# Patient Record
Sex: MALE | Race: WHITE | NOT HISPANIC OR LATINO | URBAN - METROPOLITAN AREA
[De-identification: names, ages, dates, MRNs, and addresses within clinical notes are randomized per-mention and may not be internally consistent; named-entity substitution may affect disease eponyms.]

---

## 2021-01-22 ENCOUNTER — OFFICE (AMBULATORY)
Dept: URBAN - METROPOLITAN AREA CLINIC 75 | Facility: CLINIC | Age: 21
End: 2021-01-22

## 2021-01-22 VITALS
WEIGHT: 158 LBS | HEART RATE: 78 BPM | OXYGEN SATURATION: 99 % | RESPIRATION RATE: 16 BRPM | HEIGHT: 70 IN | TEMPERATURE: 97.5 F

## 2021-01-22 DIAGNOSIS — D18.02 HEMANGIOMA OF INTRACRANIAL STRUCTURES: ICD-10-CM

## 2021-01-22 DIAGNOSIS — K21.9 GASTRO-ESOPHAGEAL REFLUX DISEASE WITHOUT ESOPHAGITIS: ICD-10-CM

## 2021-01-22 DIAGNOSIS — R11.0 NAUSEA: ICD-10-CM

## 2021-01-22 PROCEDURE — 99204 OFFICE O/P NEW MOD 45 MIN: CPT | Performed by: INTERNAL MEDICINE

## 2021-01-22 RX ORDER — OMEPRAZOLE 40 MG/1
40 CAPSULE, DELAYED RELEASE ORAL
Qty: 90 | Refills: 1 | Status: ACTIVE
Start: 2021-01-22

## 2021-01-22 NOTE — SERVICENOTES
Impression:
20M with CCMs associated with chronic nausea and GERD as well as abdominal cramping. I recommended he increase his omeprazole to 40 MG taken once daily the morning. We'll schedule him for an EGD. If symptoms persist. Further testing for celiac disease as well as breath tests. We could also do a trial of FDgard. Follow-up in 3 months.

## 2021-01-22 NOTE — SERVICEHPINOTES
1/22/2021   I did have the pleasure of seeing   CINDY SALCEDO  20  2000   at the request of   GLORIA HARTLEY  for a new patient consultation in our [facility] office. I sincerely appreciate the opportunity to participate in the care of this patient.   Mr. Salcedo is a 20M with a significant past medical history of several cerebral cavernous malformations presenting for evaluation of chronic nausea. He states that he has had nausea for most of his life. As a result of his CCMs he follow-up with her neurologist to monitor some annual MRIs. He states at one point he developed one in his spine that required surgery. The nausea has been going on for his whole life" seems to be worse over the past 3-6 months. Almost every time he eats he'll gets midepigastric cramping. He also have nausea and some vomiting. Previously was taking a medication for ADHD. He has pediatrician thought that was the cause of his nausea. It was discontinued the symptoms persisted. Several months ago he started taking Prilosec 20 MG over-the-counter with no help. He does believe that red dye and foods that contain red dye make his symptoms worse. He has a lot of abdominal cramping and achiness.He denies a change in bowel habits. No diarrhea, constipation. He has no blood in his stool. No dysphagia.No prior EGD. No prior colonoscopy.

## 2021-02-15 VITALS
HEART RATE: 95 BPM | SYSTOLIC BLOOD PRESSURE: 128 MMHG | OXYGEN SATURATION: 93 % | DIASTOLIC BLOOD PRESSURE: 79 MMHG | RESPIRATION RATE: 16 BRPM | RESPIRATION RATE: 15 BRPM | OXYGEN SATURATION: 95 % | RESPIRATION RATE: 12 BRPM | SYSTOLIC BLOOD PRESSURE: 140 MMHG | HEIGHT: 70 IN | TEMPERATURE: 98.4 F | OXYGEN SATURATION: 100 % | HEART RATE: 82 BPM | DIASTOLIC BLOOD PRESSURE: 78 MMHG | OXYGEN SATURATION: 99 % | DIASTOLIC BLOOD PRESSURE: 89 MMHG | DIASTOLIC BLOOD PRESSURE: 67 MMHG | SYSTOLIC BLOOD PRESSURE: 126 MMHG | DIASTOLIC BLOOD PRESSURE: 75 MMHG | OXYGEN SATURATION: 94 % | DIASTOLIC BLOOD PRESSURE: 77 MMHG | RESPIRATION RATE: 20 BRPM | TEMPERATURE: 97.7 F | HEART RATE: 85 BPM | HEART RATE: 83 BPM | SYSTOLIC BLOOD PRESSURE: 125 MMHG | DIASTOLIC BLOOD PRESSURE: 68 MMHG | SYSTOLIC BLOOD PRESSURE: 122 MMHG | HEART RATE: 79 BPM | WEIGHT: 157 LBS | HEART RATE: 91 BPM | RESPIRATION RATE: 22 BRPM

## 2021-02-18 ENCOUNTER — AMBULATORY SURGICAL CENTER (AMBULATORY)
Dept: URBAN - METROPOLITAN AREA SURGERY 17 | Facility: SURGERY | Age: 21
End: 2021-02-18
Payer: OTHER GOVERNMENT

## 2021-02-18 ENCOUNTER — OFFICE (AMBULATORY)
Dept: URBAN - METROPOLITAN AREA PATHOLOGY 4 | Facility: PATHOLOGY | Age: 21
End: 2021-02-18
Payer: OTHER GOVERNMENT

## 2021-02-18 DIAGNOSIS — K29.70 GASTRITIS, UNSPECIFIED, WITHOUT BLEEDING: ICD-10-CM

## 2021-02-18 DIAGNOSIS — K21.9 GASTRO-ESOPHAGEAL REFLUX DISEASE WITHOUT ESOPHAGITIS: ICD-10-CM

## 2021-02-18 DIAGNOSIS — K31.89 OTHER DISEASES OF STOMACH AND DUODENUM: ICD-10-CM

## 2021-02-18 DIAGNOSIS — R11.0 NAUSEA: ICD-10-CM

## 2021-02-18 LAB
GI HISTOLOGY: A. UNSPECIFIED: (no result)
GI HISTOLOGY: B. UNSPECIFIED: (no result)
GI HISTOLOGY: C. UNSPECIFIED: (no result)
GI HISTOLOGY: PDF REPORT: (no result)

## 2021-02-18 PROCEDURE — 43239 EGD BIOPSY SINGLE/MULTIPLE: CPT | Performed by: INTERNAL MEDICINE

## 2021-02-18 PROCEDURE — 88305 TISSUE EXAM BY PATHOLOGIST: CPT | Performed by: INTERNAL MEDICINE

## 2023-05-09 ENCOUNTER — OFFICE VISIT (OUTPATIENT)
Dept: FAMILY MEDICINE CLINIC | Facility: CLINIC | Age: 23
End: 2023-05-09
Payer: OTHER GOVERNMENT

## 2023-05-09 VITALS
SYSTOLIC BLOOD PRESSURE: 132 MMHG | DIASTOLIC BLOOD PRESSURE: 84 MMHG | BODY MASS INDEX: 25.48 KG/M2 | OXYGEN SATURATION: 98 % | WEIGHT: 172 LBS | HEART RATE: 89 BPM | TEMPERATURE: 97.1 F | RESPIRATION RATE: 16 BRPM | HEIGHT: 69 IN

## 2023-05-09 DIAGNOSIS — M79.675 PAIN IN TOE OF LEFT FOOT: ICD-10-CM

## 2023-05-09 DIAGNOSIS — K21.9 GASTROESOPHAGEAL REFLUX DISEASE, UNSPECIFIED WHETHER ESOPHAGITIS PRESENT: Primary | ICD-10-CM

## 2023-05-09 PROBLEM — G43.009 MIGRAINE WITHOUT AURA AND WITHOUT STATUS MIGRAINOSUS, NOT INTRACTABLE: Status: ACTIVE | Noted: 2018-10-29

## 2023-05-09 PROBLEM — Q28.3 CAVERNOUS MALFORMATION: Status: ACTIVE | Noted: 2017-02-27

## 2023-05-09 PROCEDURE — 99204 OFFICE O/P NEW MOD 45 MIN: CPT

## 2023-05-09 RX ORDER — RIZATRIPTAN BENZOATE 10 MG/1
10 TABLET ORAL
COMMUNITY
Start: 2017-01-27

## 2023-05-09 RX ORDER — ONDANSETRON 4 MG/1
TABLET, ORALLY DISINTEGRATING ORAL
COMMUNITY
Start: 2023-04-17

## 2023-05-09 RX ORDER — ACETAMINOPHEN 325 MG/1
650 TABLET ORAL
COMMUNITY
Start: 2020-01-30

## 2023-05-09 RX ORDER — ALBUTEROL SULFATE 90 UG/1
2 AEROSOL, METERED RESPIRATORY (INHALATION)
COMMUNITY
Start: 2019-11-06

## 2023-05-09 RX ORDER — OMEPRAZOLE 40 MG/1
40 CAPSULE, DELAYED RELEASE ORAL DAILY
Qty: 90 CAPSULE | Refills: 1 | Status: SHIPPED | OUTPATIENT
Start: 2023-05-09

## 2023-05-09 RX ORDER — OMEPRAZOLE 20 MG/1
20 CAPSULE, DELAYED RELEASE ORAL DAILY
COMMUNITY
End: 2023-05-09 | Stop reason: DRUGHIGH

## 2023-05-09 RX ORDER — DULOXETIN HYDROCHLORIDE 20 MG/1
1 CAPSULE, DELAYED RELEASE ORAL DAILY
COMMUNITY
Start: 2023-03-21

## 2023-05-09 RX ORDER — TRAZODONE HYDROCHLORIDE 50 MG/1
TABLET ORAL
COMMUNITY
Start: 2023-03-19

## 2023-05-09 RX ORDER — AMITRIPTYLINE HYDROCHLORIDE 10 MG/1
10 TABLET, FILM COATED ORAL DAILY
COMMUNITY
Start: 2019-12-09

## 2023-05-09 NOTE — PROGRESS NOTES
Chief Complaint  Establish Care (Acid reflux-takes OTC omeprazole ) and Toe Pain    Subjective         History of Present Illness    Manuel Wood 22 y.o. male presents today for a new patient appointment. He is here to establish care and is a new patient to me.  I reviewed the PFSH recorded today by my MA/LPN staff.       The patient reports acid reflux.  Symptom onset was years ago and worsened in the last year.  He does eat some spicy foods and drinks alcohol twice per month.  He does lay down less than 2-3 hours after his last meal.  Treatment to date: OTC Omeprazole with no improvement.  After a discussion, he would like to continue Omeprazole but at the 40 mg dose.  He tolerates the medication well with no side effects.  Medication reviewed.  Will increase Omeprazole to 40 mg daily with GERD dietary and lifestyle restrictions.  He denies all associated symptoms.    He has cavernous malformation.  He had a laminectomy on T-9 in 2020.  He is managed by Dr. Garcia, Neurology.      Today, he reports he hit his left fifth toe one week ago.  He did have some bruising initially, but that has resolved.  He denies pain and swelling.  He reports some decreased sensation, which is not a new finding, due to cavernous malformation.        Review of Systems   Constitutional: Negative for appetite change, chills, fatigue and fever.   HENT: Negative for congestion, sinus pressure and trouble swallowing.    Eyes: Negative for visual disturbance.   Respiratory: Negative for cough, chest tightness, shortness of breath and wheezing.    Cardiovascular: Negative for chest pain, palpitations and leg swelling.   Gastrointestinal: Negative for abdominal distention, abdominal pain, blood in stool, constipation, diarrhea, nausea, rectal pain and vomiting.        Reflux   Genitourinary: Negative for dysuria, frequency and urgency.   Musculoskeletal: Positive for arthralgias (left fifth toe). Negative for gait problem, joint swelling, myalgias  "and neck pain.   Skin: Negative for rash.   Neurological: Negative for dizziness, syncope, weakness and light-headedness.   Psychiatric/Behavioral: Negative for dysphoric mood. The patient is not nervous/anxious.         Objective   Vital Signs:   /84   Pulse 89   Temp 97.1 °F (36.2 °C)   Resp 16   Ht 175.3 cm (69\")   Wt 78 kg (172 lb)   SpO2 98%   BMI 25.40 kg/m²      BMI is >= 25 and <30. (Overweight) The following options were offered after discussion;: exercise counseling/recommendations and nutrition counseling/recommendations    Physical Exam  Vitals and nursing note reviewed.   Constitutional:       General: He is not in acute distress.     Appearance: He is well-developed and overweight.   HENT:      Head: Normocephalic and atraumatic.   Eyes:      General: No scleral icterus.        Right eye: No discharge.         Left eye: No discharge.      Conjunctiva/sclera: Conjunctivae normal.      Pupils: Pupils are equal, round, and reactive to light.   Neck:      Thyroid: No thyromegaly.      Vascular: No carotid bruit.      Trachea: No tracheal deviation.   Cardiovascular:      Rate and Rhythm: Normal rate and regular rhythm.      Pulses: Normal pulses.      Heart sounds: Normal heart sounds. No murmur heard.    No gallop.   Pulmonary:      Effort: Pulmonary effort is normal. No respiratory distress.      Breath sounds: Normal breath sounds. No wheezing or rales.   Musculoskeletal:         General: No swelling.      Cervical back: Normal range of motion and neck supple.      Right lower leg: No edema.      Left lower leg: No edema.      Left foot: Decreased range of motion. Normal capillary refill. Tenderness present. No swelling, foot drop or bony tenderness. Normal pulse.      Comments: Pain with palpation of left fifth toe.  Decreased range of motion due to pain.  Mild erythema.  No swelling.  Sensation is intact.  No decreased pulses.   Feet:      Left foot:      Skin integrity: Erythema (Left " fifth toe) present. No ulcer, blister, skin breakdown or warmth.   Skin:     General: Skin is warm.      Coloration: Skin is not pale.      Findings: No erythema or rash.   Neurological:      Mental Status: He is alert and oriented to person, place, and time.      Sensory: Sensation is intact. No sensory deficit.      Motor: Motor function is intact. No weakness or abnormal muscle tone.      Coordination: Coordination normal.      Gait: Gait is intact. Gait normal.   Psychiatric:         Behavior: Behavior normal.         Thought Content: Thought content normal.         Judgment: Judgment normal.                         Assessment and Plan      Diagnoses and all orders for this visit:    1. Gastroesophageal reflux disease, unspecified whether esophagitis present (Primary)  Comments:  New patient  Increase Omeprazole to 40 mg daily  GERD dietary/lifestyle modifications-info in [employer]  Return if no improvement  Orders:  -     Comprehensive metabolic panel  -     Lipid panel  -     CBC and Differential  -     TSH  -     omeprazole (priLOSEC) 40 MG capsule; Take 1 capsule by mouth Daily.  Dispense: 90 capsule; Refill: 1    2. Pain in toe of left foot  Comments:  X-ray today-pending Radiology review  RICE therapy, NSAID's as needed  Wrap last two toes  Return if no improvement  Orders:  -     XR Foot 3+ View Left (In Office)            Follow Up     Return if symptoms worsen or fail to improve.    Patient was given instructions and counseling regarding his condition or for health maintenance advice. Please see specific information pulled into the AVS if appropriate.     -Return if symptoms worsen or do not improve.

## 2023-05-17 ENCOUNTER — PATIENT ROUNDING (BHMG ONLY) (OUTPATIENT)
Dept: FAMILY MEDICINE CLINIC | Facility: CLINIC | Age: 23
End: 2023-05-17
Payer: OTHER GOVERNMENT

## 2023-05-17 NOTE — PROGRESS NOTES
A [employer] message was sent to the patient for PATIENT ROUNDING with [facility].

## 2023-10-25 ENCOUNTER — OFFICE VISIT (OUTPATIENT)
Dept: FAMILY MEDICINE CLINIC | Facility: CLINIC | Age: 23
End: 2023-10-25
Payer: OTHER GOVERNMENT

## 2023-10-25 VITALS
OXYGEN SATURATION: 98 % | HEIGHT: 69 IN | TEMPERATURE: 97.5 F | HEART RATE: 75 BPM | SYSTOLIC BLOOD PRESSURE: 122 MMHG | RESPIRATION RATE: 16 BRPM | DIASTOLIC BLOOD PRESSURE: 80 MMHG | BODY MASS INDEX: 26.45 KG/M2 | WEIGHT: 178.6 LBS

## 2023-10-25 DIAGNOSIS — R05.1 ACUTE COUGH: ICD-10-CM

## 2023-10-25 DIAGNOSIS — R11.2 NAUSEA AND VOMITING, UNSPECIFIED VOMITING TYPE: ICD-10-CM

## 2023-10-25 DIAGNOSIS — H65.191 OTHER ACUTE NONSUPPURATIVE OTITIS MEDIA OF RIGHT EAR, RECURRENCE NOT SPECIFIED: ICD-10-CM

## 2023-10-25 DIAGNOSIS — K21.9 GASTROESOPHAGEAL REFLUX DISEASE, UNSPECIFIED WHETHER ESOPHAGITIS PRESENT: Primary | ICD-10-CM

## 2023-10-25 DIAGNOSIS — J02.9 SORE THROAT: ICD-10-CM

## 2023-10-25 LAB
EXPIRATION DATE: NORMAL
INTERNAL CONTROL: NORMAL
Lab: NORMAL
S PYO AG THROAT QL: NEGATIVE

## 2023-10-25 PROCEDURE — 99214 OFFICE O/P EST MOD 30 MIN: CPT

## 2023-10-25 PROCEDURE — 87880 STREP A ASSAY W/OPTIC: CPT

## 2023-10-25 RX ORDER — ONDANSETRON 4 MG/1
4 TABLET, ORALLY DISINTEGRATING ORAL EVERY 8 HOURS PRN
Qty: 30 TABLET | Refills: 1 | Status: SHIPPED | OUTPATIENT
Start: 2023-10-25

## 2023-10-25 RX ORDER — PANTOPRAZOLE SODIUM 40 MG/1
40 TABLET, DELAYED RELEASE ORAL DAILY
Qty: 90 TABLET | Refills: 1 | Status: SHIPPED | OUTPATIENT
Start: 2023-10-25

## 2023-10-25 RX ORDER — LEVOFLOXACIN 500 MG/1
500 TABLET, FILM COATED ORAL DAILY
Qty: 5 TABLET | Refills: 0 | Status: SHIPPED | OUTPATIENT
Start: 2023-10-25 | End: 2023-10-30 | Stop reason: SDUPTHER

## 2023-10-25 NOTE — PROGRESS NOTES
Chief Complaint  Vomiting, Fatigue, and Heartburn    Subjective         Vomiting   Associated symptoms include coughing and diarrhea (improved). Pertinent negatives include no abdominal pain, chest pain, chills, dizziness, fever or myalgias.   Fatigue  Associated symptoms include coughing, fatigue, nausea, a sore throat and vomiting. Pertinent negatives include no abdominal pain, chest pain, chills, congestion, fever, myalgias, neck pain, rash or weakness.     Manuel Wood 23 y.o. male presents for evaluation of acid reflux, sore throat, cough, ear pressure, fatigue. Symptoms include ear pressure, sore throat, dry cough, fatigue, and vomiting.  Onset of symptoms was 1 week ago, stable since that time.  Patient denies shortness of breath, wheezing, hemoptysis, pleuritic chest pain, fever, dyspnea on exertion, ear drainage, eye discharge, diarrhea, vertigo, sneezing, chills, sweats, epistaxis, high fever.   Evaluation to date: none Treatment to date:   Omeprazole and triptan medication .  He is eating a regular diet and is staying hydrated.  He ate a burrito and beans last night with no vomiting.  He has not had any vomiting for 24 hours.    He is a non-smoker and does not drink alcohol.  He does use THC nightly for chronic back pain.  He does have GERD.  He takes Omeprazole 40 mg daily and is compliant with taking medication.      Review of Systems   Constitutional:  Positive for fatigue. Negative for appetite change, chills and fever.   HENT:  Positive for ear pain (ear pressure) and sore throat. Negative for congestion, hearing loss, sinus pressure, tinnitus and trouble swallowing.    Eyes:  Negative for visual disturbance.   Respiratory:  Positive for cough. Negative for chest tightness, shortness of breath and wheezing.    Cardiovascular:  Negative for chest pain, palpitations and leg swelling.   Gastrointestinal:  Positive for diarrhea (improved), nausea and vomiting. Negative for abdominal distention, abdominal  "pain, anal bleeding, blood in stool, constipation and rectal pain.        Reflux     Genitourinary:  Negative for dysuria, frequency and urgency.   Musculoskeletal:  Negative for gait problem, myalgias and neck pain.   Skin:  Negative for rash.   Neurological:  Negative for dizziness, syncope, weakness and light-headedness.   Psychiatric/Behavioral:  Negative for dysphoric mood. The patient is not nervous/anxious.         Objective   Vital Signs:   /80 (BP Location: Left arm, Patient Position: Sitting, Cuff Size: Adult)   Pulse 75   Temp 97.5 °F (36.4 °C) (Oral)   Resp 16   Ht 175.3 cm (69.02\")   Wt 81 kg (178 lb 9.6 oz)   SpO2 98%   BMI 26.36 kg/m²          Physical Exam  Vitals and nursing note reviewed.   Constitutional:       General: He is not in acute distress.     Appearance: He is well-developed. He is not toxic-appearing or diaphoretic.   HENT:      Head: Normocephalic and atraumatic. Hair is normal.      Right Ear: Hearing and external ear normal. No decreased hearing noted. No drainage, swelling or tenderness. There is no impacted cerumen. Tympanic membrane is erythematous and bulging. Tympanic membrane is not injected or retracted.      Left Ear: Hearing and external ear normal. No decreased hearing noted. No drainage, swelling or tenderness. There is no impacted cerumen. Tympanic membrane is not injected, erythematous, retracted or bulging.      Nose: No mucosal edema, congestion or rhinorrhea.      Right Sinus: No maxillary sinus tenderness or frontal sinus tenderness.      Left Sinus: No maxillary sinus tenderness or frontal sinus tenderness.      Mouth/Throat:      Mouth: Mucous membranes are moist. No oral lesions.      Pharynx: Uvula midline. Posterior oropharyngeal erythema present. No pharyngeal swelling, oropharyngeal exudate or uvula swelling.      Tonsils: No tonsillar exudate or tonsillar abscesses. 2+ on the right. 2+ on the left.   Eyes:      General: No scleral icterus.        " Right eye: No discharge.         Left eye: No discharge.      Conjunctiva/sclera: Conjunctivae normal.      Pupils: Pupils are equal, round, and reactive to light.   Cardiovascular:      Rate and Rhythm: Normal rate and regular rhythm.      Pulses: Normal pulses.      Heart sounds: Normal heart sounds. No murmur heard.     No gallop.   Pulmonary:      Effort: No respiratory distress.      Breath sounds: Normal breath sounds. No stridor or decreased air movement. No decreased breath sounds, wheezing, rhonchi or rales.   Chest:      Chest wall: No tenderness.   Abdominal:      Palpations: Abdomen is soft.      Tenderness: There is no abdominal tenderness.   Musculoskeletal:      Cervical back: Normal range of motion and neck supple.   Lymphadenopathy:      Cervical: No cervical adenopathy.   Skin:     General: Skin is warm and dry.      Findings: No rash.   Neurological:      Mental Status: He is alert and oriented to person, place, and time.      Motor: No abnormal muscle tone.   Psychiatric:         Behavior: Behavior normal.         Thought Content: Thought content normal.         Judgment: Judgment normal.          The following data was reviewed by: DEANNA Naqvi on 10/25/2023:  POCT rapid strep A (10/25/2023 09:42)              Assessment and Plan      Diagnoses and all orders for this visit:    1. Gastroesophageal reflux disease, unspecified whether esophagitis present (Primary)  Comments:  D/C Omeprazole, start Pantoprazole  GERD diet  Return if no improvement  Orders:  -     pantoprazole (Protonix) 40 MG EC tablet; Take 1 tablet by mouth Daily.  Dispense: 90 tablet; Refill: 1    2. Other acute nonsuppurative otitis media of right ear, recurrence not specified  Comments:  Levaquin as directed  Orders:  -     levoFLOXacin (Levaquin) 500 MG tablet; Take 1 tablet by mouth Daily.  Dispense: 5 tablet; Refill: 0    3. Nausea and vomiting, unspecified vomiting type  -     ondansetron ODT (ZOFRAN-ODT) 4 MG  disintegrating tablet; Place 1 tablet on the tongue Every 8 (Eight) Hours As Needed for Nausea or Vomiting.  Dispense: 30 tablet; Refill: 1    4. Sore throat  -     POCT rapid strep A    5. Acute cough            Follow Up     Return if symptoms worsen or fail to improve.    Patient was given instructions and counseling regarding his condition or for health maintenance advice. Please see specific information pulled into the AVS if appropriate.     -Take medication as prescribed.  -Rapid strep testing is negative today.  I believe sore throat and vomiting are GERD induced.  D/C Omeprazole and start Pantoprazole.  GERD diet-info given in MyChart.  Gargle with warm salt water 3-4 times daily.  -Monitor for fever and take Tylenol as needed.  Drink plenty of fluids and get plenty of rest.  -Use cool-mist humidifier as needed.  -Seek immediate medical attention for fever unrelieved by Tylenol, chest pain, shortness of breath, decreased oxygen saturations, sharp pain with breathing, or any other worsening signs or symptoms.  -Return to the office is symptoms worsen or do not improve.

## 2023-10-30 ENCOUNTER — TELEPHONE (OUTPATIENT)
Dept: FAMILY MEDICINE CLINIC | Facility: CLINIC | Age: 23
End: 2023-10-30

## 2023-10-30 DIAGNOSIS — H65.191 OTHER ACUTE NONSUPPURATIVE OTITIS MEDIA OF RIGHT EAR, RECURRENCE NOT SPECIFIED: ICD-10-CM

## 2023-10-30 RX ORDER — LEVOFLOXACIN 500 MG/1
500 TABLET, FILM COATED ORAL DAILY
Qty: 5 TABLET | Refills: 0 | Status: SHIPPED | OUTPATIENT
Start: 2023-10-30

## 2023-10-30 NOTE — TELEPHONE ENCOUNTER
Caller: Manuel Wood    Relationship: Self    Best call back number: [phone]    What medication are you requesting: ANTIBIOTICS     What are your current symptoms: EAR ACHE     How long have you been experiencing symptoms: A WEEK     Have you had these symptoms before:    [x] Yes  [] No    Have you been treated for these symptoms before:   [x] Yes  [] No    If a prescription is needed, what is your preferred pharmacy and phone number: [id number] [place], [place] - [address] [address] AT [facility] - [phone] [facility] [phone]      Additional notes: PATIENT STATES THAT HE WAS GIVEN ANTIBIOITICS ALREADY TO TREAT THIS ISSUE. PATIENT TOOK ALL THE MEDICATION THAT WAS PRESCRIBED, AND HE IS STILL HAVING THIS ISSUE. PLEASE ADVISE IF THIS MEDICATION CAN BE CALLED IN FOR HIM.    PATIENT WILL NEED A DOCTORS NOTE FOR WORK, PLEASE SEND THIS THROUGH [employer].

## 2023-12-08 ENCOUNTER — OFFICE VISIT (OUTPATIENT)
Dept: FAMILY MEDICINE CLINIC | Facility: CLINIC | Age: 23
End: 2023-12-08
Payer: OTHER GOVERNMENT

## 2023-12-08 VITALS
WEIGHT: 179 LBS | OXYGEN SATURATION: 98 % | HEART RATE: 89 BPM | BODY MASS INDEX: 26.51 KG/M2 | HEIGHT: 69 IN | DIASTOLIC BLOOD PRESSURE: 90 MMHG | TEMPERATURE: 98.5 F | SYSTOLIC BLOOD PRESSURE: 152 MMHG

## 2023-12-08 DIAGNOSIS — K92.0 HEMATEMESIS WITH NAUSEA: Primary | ICD-10-CM

## 2023-12-08 DIAGNOSIS — R10.84 GENERALIZED ABDOMINAL PAIN: ICD-10-CM

## 2023-12-08 NOTE — PROGRESS NOTES
"Chief Complaint  Vomiting Blood and Abdominal Pain    Subjective         History of Present Illness    Manuel Wood 23 y.o. male who presents for evaluation of nausea, vomiting with having 1episodes in last 24 hours , and abdominal pain. Symptoms have been present for 2 weeks.  The condition is aggravated by eating spicy foods.  He is experiencing abdominal pain, nausea, vomiting, and vomiting blood.  Alleviating factors are Pepto Bismol and Tums with some help, but still symptoms.  Patient denies fever, chills, diarrhea, fatty food intolerance, hematuria, dysphagia, recent travel, recently taking antibiotics, and pain referring to the back.  His past medical history is notable for GERD.  Patient denies recent travel.      Last episode of vomiting bright red blood was one week ago.  He believes he is staying hydrated.    Alcohol use: rarely  NSAID use: none       Review of Systems   Constitutional:  Positive for appetite change (decreased appetite). Negative for chills, fatigue and fever.   HENT:  Negative for congestion, sinus pressure and trouble swallowing.    Eyes:  Negative for visual disturbance.   Respiratory:  Negative for cough, chest tightness, shortness of breath and wheezing.    Cardiovascular:  Negative for chest pain, palpitations and leg swelling.   Gastrointestinal:  Positive for abdominal pain, nausea and vomiting. Negative for abdominal distention, anal bleeding, blood in stool, constipation, diarrhea and rectal pain.   Genitourinary:  Negative for dysuria, frequency and urgency.   Musculoskeletal:  Negative for gait problem, myalgias and neck pain.   Skin:  Negative for rash.   Neurological:  Negative for dizziness, syncope, weakness and light-headedness.   Psychiatric/Behavioral:  Negative for dysphoric mood. The patient is not nervous/anxious.         Objective   Vital Signs:   /90   Pulse 89   Temp 98.5 °F (36.9 °C)   Ht 175.3 cm (69.02\")   Wt 81.2 kg (179 lb)   SpO2 98%   BMI 26.42 " kg/m²          Physical Exam  Vitals and nursing note reviewed.   Constitutional:       Appearance: He is well-developed. He is ill-appearing. He is not diaphoretic.   HENT:      Head: Normocephalic and atraumatic.   Eyes:      General: No scleral icterus.     Conjunctiva/sclera: Conjunctivae normal.      Pupils: Pupils are equal, round, and reactive to light.   Cardiovascular:      Rate and Rhythm: Normal rate and regular rhythm.      Pulses: Normal pulses.      Heart sounds: Normal heart sounds. No murmur heard.     No gallop.   Pulmonary:      Effort: Pulmonary effort is normal. No respiratory distress.      Breath sounds: Normal breath sounds. No wheezing or rales.   Abdominal:      General: Bowel sounds are normal. There is no distension or abdominal bruit.      Palpations: Abdomen is soft. Abdomen is not rigid. There is no hepatomegaly, splenomegaly or mass.      Tenderness: There is generalized abdominal tenderness. There is guarding. There is no rebound. Negative signs include Mg's sign and McBurney's sign.      Comments: Moderate generalized abdominal tenderness with palpation.  Abdomen palpates soft.  Bowel sounds are active in all four quadrants.  Generalized guarding.  No rebound tenderness.   Musculoskeletal:         General: No deformity. Normal range of motion.      Cervical back: Normal range of motion.   Skin:     General: Skin is warm and dry.      Findings: No rash.   Neurological:      Mental Status: He is alert and oriented to person, place, and time.   Psychiatric:         Mood and Affect: Mood normal.                         Assessment and Plan      Diagnoses and all orders for this visit:    1. Hematemesis with nausea (Primary)  Comments:  EMS was called to transport patient to the hospital for further evaluation    2. Generalized abdominal pain  Comments:  EMS was called to transport patient to the hospital for further evaluation            Follow Up     Return follow up after hospital  discharge.    Patient was given instructions and counseling regarding his condition or for health maintenance advice. Please see specific information pulled into the AVS if appropriate.

## 2023-12-12 ENCOUNTER — TELEPHONE (OUTPATIENT)
Dept: FAMILY MEDICINE CLINIC | Facility: CLINIC | Age: 23
End: 2023-12-12

## 2023-12-12 NOTE — TELEPHONE ENCOUNTER
Caller: SANDRA LEBLANC    Relationship: Mother    Best call back number: [phone]     What is the best time to reach you: ANYTIME    Who are you requesting to speak with (clinical staff, provider,  specific staff member): CLINICAL    What was the call regarding: PATIENTS MOTHER STATED THE PATIENT HAS BEEN UNABLE TO PASS A BOWEL MOVEMENT FOR THE PAST SIX DAYS.    PATIENTS MOTHER STATED PATIENT IS COMPLAINING OF PAIN IN ABDOMEN.    PATIENTS MOTHER STATED PATIENT WAS TAKEN TO THE ER 12- AND HAD IMAGING PREFORMED FOR BLEEDS, HOWEVER NOTHING WAS FOUND.    PATIENTS MOTHER STATED PATIENT HAS BEEN ABLE TO URINATE.    PATIENT MOTHER IS REQUESTING TO KNOW WHAT TO DO.    PLEASE CALL TO DISCUSS AND ADVISE.

## 2023-12-13 ENCOUNTER — TELEPHONE (OUTPATIENT)
Dept: FAMILY MEDICINE CLINIC | Facility: CLINIC | Age: 23
End: 2023-12-13

## 2023-12-13 DIAGNOSIS — R19.5 DARK STOOLS: Primary | ICD-10-CM

## 2023-12-13 DIAGNOSIS — K92.0 HEMATEMESIS OF UNKNOWN CAUSE: ICD-10-CM

## 2023-12-13 NOTE — TELEPHONE ENCOUNTER
Caller: YADI DOAN    Relationship: Other    Best call back number: [phone]    What is the medical concern/diagnosis:     What specialty or service is being requested: GASTROENTEROLOGY    What is the provider, practice or medical service name: [facility]    What is the office location: [address] [place] [address]    What is the office phone number: [phone]    Any additional details: HE WAS SEEN 12/13 FOR HOSPITAL FOLLOW UP      DELETE AFTER READING TO PATIENT: “ Thank you for sharing this information. I will send a message to the clinical team. Please allow 48 hours for the clinical staff to follow up on this request.”

## 2023-12-15 NOTE — TELEPHONE ENCOUNTER
Pt is efrain for EGD/Colonoscopy by Dr. Gomez.  Pt needed updated referral to reflect provider eval and what procedures that will be done.

## 2023-12-18 ENCOUNTER — TELEPHONE (OUTPATIENT)
Dept: FAMILY MEDICINE CLINIC | Facility: CLINIC | Age: 23
End: 2023-12-18

## 2023-12-18 NOTE — TELEPHONE ENCOUNTER
Caller: [facility]    Relationship:     Best call back number: [phone]    What is the medical concern/diagnosis: COLONOSCOPY, EGD TEST     What specialty or service is being requested: GASTROENTEROLOGY     What is the provider, practice or medical service name: [facility]  [phone]    [facility] STATED THIS NEEDS TO BE DONE BY 12 PM TODAY. PLEASE CALL ONCE COMPLETED

## 2023-12-19 ENCOUNTER — TELEPHONE (OUTPATIENT)
Dept: FAMILY MEDICINE CLINIC | Facility: CLINIC | Age: 23
End: 2023-12-19

## 2023-12-19 NOTE — TELEPHONE ENCOUNTER
Caller: SANDRA LEBLANC    Relationship: Mother    Best call back number: [phone]     What is the best time to reach you: ANYTIME    Who are you requesting to speak with (clinical staff, provider,  specific staff member): CLINICAL STAFF    Do you know the name of the person who called:     What was the call regarding: REFERRAL     Is it okay if the provider responds through MyChart: NO, PATIENT MOTHER ON  [employer] IS NEEDING TO SPEAK WITH REFERRAL SPECIALIST.     PLEASE CALL ASAP AS PATIENT HAS PROCEDURE SCHEDULED FOR 12/20/23.

## 2024-02-12 ENCOUNTER — TELEPHONE (OUTPATIENT)
Dept: FAMILY MEDICINE CLINIC | Facility: CLINIC | Age: 24
End: 2024-02-12

## 2024-03-05 DIAGNOSIS — Q28.3 CAVERNOUS MALFORMATION: Primary | ICD-10-CM

## 2024-03-05 DIAGNOSIS — G83.81 BROWN-SEQUARD SYNDROME: ICD-10-CM

## 2024-03-05 DIAGNOSIS — G44.229 CHRONIC TENSION-TYPE HEADACHE, NOT INTRACTABLE: ICD-10-CM

## 2024-03-05 DIAGNOSIS — Q28.3 CEREBROVASCULAR MALFORMATION: ICD-10-CM

## 2024-03-05 NOTE — TELEPHONE ENCOUNTER
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

## 2024-05-01 ENCOUNTER — TELEPHONE (OUTPATIENT)
Dept: INTERNAL MEDICINE | Facility: CLINIC | Age: 24
End: 2024-05-01
Payer: OTHER GOVERNMENT

## 2024-05-01 NOTE — TELEPHONE ENCOUNTER
Evelia with [facility] called and asked for a [facility] referral for PT.  Upon looking in the chart there are two approved referrals for [facility] and OT. I faxed those to Evelia. She is going to review and if she needs anything else, she will call our office back.

## 2024-07-03 DIAGNOSIS — K21.9 GASTROESOPHAGEAL REFLUX DISEASE, UNSPECIFIED WHETHER ESOPHAGITIS PRESENT: ICD-10-CM

## 2024-07-03 NOTE — TELEPHONE ENCOUNTER
Caller: SANDRA LEBLANC    Relationship: Mother    Best call back number: [phone]     Requested Prescriptions:   Requested Prescriptions     Pending Prescriptions Disp Refills    pantoprazole (Protonix) 40 MG EC tablet 90 tablet 1     Sig: Take 1 tablet by mouth Daily.        Pharmacy where request should be sent: [id number] [place], [place] - [address] AT [facility] - [phone]  - [phone] FX     Last office visit with prescribing clinician: 12/8/2023   Last telemedicine visit with prescribing clinician: Visit date not found   Next office visit with prescribing clinician: 7/18/2024     Additional details provided by patient: WILL NEED NEW PRESCRIPTION AND PATIENT IS COMPLETELY OUT.      Does the patient have less than a 3 day supply:  [x] Yes  [] No    Would you like a call back once the refill request has been completed: [] Yes [] No    If the office needs to give you a call back, can they leave a voicemail: [] Yes [] No    Codi Nelson Rep   07/03/24 14:07 EDT

## 2024-07-04 RX ORDER — PANTOPRAZOLE SODIUM 40 MG/1
40 TABLET, DELAYED RELEASE ORAL DAILY
Qty: 30 TABLET | Refills: 0 | Status: SHIPPED | OUTPATIENT
Start: 2024-07-04

## 2024-08-08 DIAGNOSIS — K21.9 GASTROESOPHAGEAL REFLUX DISEASE, UNSPECIFIED WHETHER ESOPHAGITIS PRESENT: ICD-10-CM

## 2024-08-08 RX ORDER — PANTOPRAZOLE SODIUM 40 MG/1
40 TABLET, DELAYED RELEASE ORAL DAILY
Qty: 30 TABLET | Refills: 1 | Status: SHIPPED | OUTPATIENT
Start: 2024-08-08

## 2024-10-17 DIAGNOSIS — K21.9 GASTROESOPHAGEAL REFLUX DISEASE, UNSPECIFIED WHETHER ESOPHAGITIS PRESENT: ICD-10-CM

## 2024-10-17 RX ORDER — PANTOPRAZOLE SODIUM 40 MG/1
40 TABLET, DELAYED RELEASE ORAL DAILY
Qty: 90 TABLET | Refills: 0 | Status: SHIPPED | OUTPATIENT
Start: 2024-10-17

## 2024-11-05 ENCOUNTER — HOSPITAL ENCOUNTER (OUTPATIENT)
Dept: CT IMAGING | Facility: HOSPITAL | Age: 24
Discharge: HOME | End: 2024-11-05
Payer: COMMERCIAL

## 2024-11-05 ENCOUNTER — OFFICE VISIT (OUTPATIENT)
Dept: FAMILY MEDICINE CLINIC | Facility: CLINIC | Age: 24
End: 2024-11-05
Payer: COMMERCIAL

## 2024-11-05 ENCOUNTER — TELEPHONE (OUTPATIENT)
Dept: FAMILY MEDICINE CLINIC | Facility: CLINIC | Age: 24
End: 2024-11-05

## 2024-11-05 VITALS
BODY MASS INDEX: 26.6 KG/M2 | HEIGHT: 69 IN | SYSTOLIC BLOOD PRESSURE: 146 MMHG | WEIGHT: 179.6 LBS | OXYGEN SATURATION: 96 % | DIASTOLIC BLOOD PRESSURE: 106 MMHG | TEMPERATURE: 98.4 F | HEART RATE: 98 BPM

## 2024-11-05 DIAGNOSIS — R05.8 PRODUCTIVE COUGH: ICD-10-CM

## 2024-11-05 DIAGNOSIS — R03.0 ELEVATED BLOOD PRESSURE READING IN OFFICE WITHOUT DIAGNOSIS OF HYPERTENSION: ICD-10-CM

## 2024-11-05 DIAGNOSIS — H65.192 OTHER ACUTE NONSUPPURATIVE OTITIS MEDIA OF LEFT EAR, RECURRENCE NOT SPECIFIED: ICD-10-CM

## 2024-11-05 DIAGNOSIS — K92.0 HEMATEMESIS WITH NAUSEA: ICD-10-CM

## 2024-11-05 DIAGNOSIS — R50.9 FEVER, UNSPECIFIED FEVER CAUSE: ICD-10-CM

## 2024-11-05 DIAGNOSIS — R10.84 GENERALIZED ABDOMINAL PAIN: ICD-10-CM

## 2024-11-05 DIAGNOSIS — J06.9 UPPER RESPIRATORY TRACT INFECTION, UNSPECIFIED TYPE: Primary | ICD-10-CM

## 2024-11-05 DIAGNOSIS — L98.9 SKIN LESIONS: ICD-10-CM

## 2024-11-05 LAB
ALBUMIN SERPL-MCNC: 4.5 G/DL (ref 3.5–5.2)
ALBUMIN/GLOB SERPL: 1.4 G/DL
ALP SERPL-CCNC: 95 U/L (ref 39–117)
ALT SERPL W P-5'-P-CCNC: 64 U/L (ref 1–41)
AMYLASE SERPL-CCNC: 64 U/L (ref 28–100)
ANION GAP SERPL CALCULATED.3IONS-SCNC: 11.6 MMOL/L (ref 5–15)
ANISOCYTOSIS BLD QL: ABNORMAL
AST SERPL-CCNC: 29 U/L (ref 1–40)
BASOPHILS # BLD MANUAL: 0 10*3/MM3 (ref 0–0.2)
BASOPHILS NFR BLD MANUAL: 0 % (ref 0–1.5)
BILIRUB SERPL-MCNC: 0.4 MG/DL (ref 0–1.2)
BUN SERPL-MCNC: 13 MG/DL (ref 6–20)
BUN/CREAT SERPL: 15.7 (ref 7–25)
CALCIUM SPEC-SCNC: 9.5 MG/DL (ref 8.6–10.5)
CHLORIDE SERPL-SCNC: 101 MMOL/L (ref 98–107)
CO2 SERPL-SCNC: 26.4 MMOL/L (ref 22–29)
CREAT SERPL-MCNC: 0.83 MG/DL (ref 0.76–1.27)
DEPRECATED RDW RBC AUTO: 40.8 FL (ref 37–54)
EGFRCR SERPLBLD CKD-EPI 2021: 126.1 ML/MIN/1.73
EOSINOPHIL # BLD MANUAL: 0.3 10*3/MM3 (ref 0–0.4)
EOSINOPHIL NFR BLD MANUAL: 4.1 % (ref 0.3–6.2)
ERYTHROCYTE [DISTWIDTH] IN BLOOD BY AUTOMATED COUNT: 13.1 % (ref 12.3–15.4)
GLOBULIN UR ELPH-MCNC: 3.2 GM/DL
GLUCOSE SERPL-MCNC: 106 MG/DL (ref 65–99)
HCT VFR BLD AUTO: 51.5 % (ref 37.5–51)
HGB BLD-MCNC: 17.3 G/DL (ref 13–17.7)
LIPASE SERPL-CCNC: 32 U/L (ref 13–60)
LYMPHOCYTES # BLD MANUAL: 1.74 10*3/MM3 (ref 0.7–3.1)
LYMPHOCYTES NFR BLD MANUAL: 23.7 % (ref 5–12)
MCH RBC QN AUTO: 28.8 PG (ref 26.6–33)
MCHC RBC AUTO-ENTMCNC: 33.6 G/DL (ref 31.5–35.7)
MCV RBC AUTO: 85.7 FL (ref 79–97)
MICROCYTES BLD QL: ABNORMAL
MONOCYTES # BLD: 1.74 10*3/MM3 (ref 0.1–0.9)
NEUTROPHILS # BLD AUTO: 3.56 10*3/MM3 (ref 1.7–7)
NEUTROPHILS NFR BLD MANUAL: 48.5 % (ref 42.7–76)
NRBC BLD AUTO-RTO: 0 /100 WBC (ref 0–0.2)
PLAT MORPH BLD: NORMAL
PLATELET # BLD AUTO: 313 10*3/MM3 (ref 140–450)
PMV BLD AUTO: 10.1 FL (ref 6–12)
POTASSIUM SERPL-SCNC: 4 MMOL/L (ref 3.5–5.2)
PROT SERPL-MCNC: 7.7 G/DL (ref 6–8.5)
RBC # BLD AUTO: 6.01 10*6/MM3 (ref 4.14–5.8)
SODIUM SERPL-SCNC: 139 MMOL/L (ref 136–145)
VARIANT LYMPHS NFR BLD MANUAL: 23.7 % (ref 19.6–45.3)
WBC MORPH BLD: NORMAL
WBC NRBC COR # BLD AUTO: 7.33 10*3/MM3 (ref 3.4–10.8)

## 2024-11-05 PROCEDURE — 36415 COLL VENOUS BLD VENIPUNCTURE: CPT

## 2024-11-05 PROCEDURE — 85007 BL SMEAR W/DIFF WBC COUNT: CPT

## 2024-11-05 PROCEDURE — 82150 ASSAY OF AMYLASE: CPT

## 2024-11-05 PROCEDURE — 74177 CT ABD & PELVIS W/CONTRAST: CPT

## 2024-11-05 PROCEDURE — 80053 COMPREHEN METABOLIC PANEL: CPT

## 2024-11-05 PROCEDURE — 85025 COMPLETE CBC W/AUTO DIFF WBC: CPT

## 2024-11-05 PROCEDURE — 83690 ASSAY OF LIPASE: CPT

## 2024-11-05 PROCEDURE — 25510000001 IOPAMIDOL 61 % SOLUTION

## 2024-11-05 PROCEDURE — 99214 OFFICE O/P EST MOD 30 MIN: CPT

## 2024-11-05 RX ORDER — METHYLPREDNISOLONE 4 MG/1
TABLET ORAL
COMMUNITY
End: 2024-11-05

## 2024-11-05 RX ORDER — LEVOFLOXACIN 500 MG/1
500 TABLET, FILM COATED ORAL DAILY
Qty: 5 TABLET | Refills: 0 | Status: SHIPPED | OUTPATIENT
Start: 2024-11-05

## 2024-11-05 RX ORDER — IOPAMIDOL 612 MG/ML
100 INJECTION, SOLUTION INTRAVASCULAR
Status: COMPLETED | OUTPATIENT
Start: 2024-11-05 | End: 2024-11-05

## 2024-11-05 RX ORDER — ALBUTEROL SULFATE 90 UG/1
2 INHALANT RESPIRATORY (INHALATION) EVERY 4 HOURS PRN
Qty: 8 G | Refills: 3 | Status: SHIPPED | OUTPATIENT
Start: 2024-11-05

## 2024-11-05 RX ADMIN — IOPAMIDOL 85 ML: 612 INJECTION, SOLUTION INTRAVENOUS at 12:31

## 2024-11-05 NOTE — TELEPHONE ENCOUNTER
Called scheduling dept to schedule a stat CT. Spoke with Odette. Pt has an appt today @ 11:30 at [facility]. Called and spoke with pt, he stated that he could make that appt.

## 2024-11-05 NOTE — PROGRESS NOTES
Chief Complaint  Cough (X's 1 week- dx with uri @ [facility] 5 days ago), Nasal Congestion, URI, Generalized Body Aches, Fatigue, and Sore Throat    Subjective          Cough  Associated symptoms include a fever, myalgias, postnasal drip and a sore throat. Pertinent negatives include no chest pain, chills, ear pain, rash, shortness of breath or wheezing.   URI   Associated symptoms include congestion, coughing, nausea, a sore throat and vomiting. Pertinent negatives include no abdominal pain, chest pain, diarrhea, dysuria, ear pain, neck pain, rash, sinus pain or wheezing.   Fatigue  Associated symptoms include congestion, coughing, fatigue, a fever, myalgias, nausea, a sore throat and vomiting. Pertinent negatives include no abdominal pain, chest pain, chills, neck pain, rash or weakness.   Sore Throat   Associated symptoms include congestion, coughing and vomiting. Pertinent negatives include no abdominal pain, diarrhea, ear pain, neck pain, shortness of breath or trouble swallowing.     History of Present Illness      Manuel Wood 24 y.o. male presents to follow up on URI and cough.    Mr. Wood presented to [facility] on 10/31/2024 and was diagnosed with viral URI with cough, acute pharyngitis, and elevated blood pressure reading.  He was given prescriptions for Bromfed-DM cough syrup, Medrol Dosepak, and Nasacort nasal inhaler.    He reports ongoing chest congestion, fatigue, productive cough with green sputum, sore throat, fever, and myalgia. TMAX 100.0 F and reduced well with Tylenol. He reports the Medrol dose pack helped with symptoms, but he is still feeling poorly. Symptom onset was 5 days ago.     Mr. Wood has cavernous malformation. He has been vomiting anywhere from two days per week to daily for 5-7 years. Emesis has small amounts of blood at times. He reports associated nausea with episodes of vomiting. Last upper endoscopy was performed in December 2023 per Dr. Shearer, which revealed  "inflammation in esophagus and stomach (per patient). He reports the vomiting occurs no matter what he eats. He does not take a daily probiotic.     He also reports a lesion on his left upper extremity, small dark spots, and dark spots on both heels.         Review of Systems   Constitutional:  Positive for fatigue and fever. Negative for appetite change and chills.   HENT:  Positive for congestion, postnasal drip and sore throat. Negative for ear pain, sinus pressure and trouble swallowing.    Eyes:  Negative for visual disturbance.   Respiratory:  Positive for cough. Negative for chest tightness, shortness of breath and wheezing.    Cardiovascular:  Negative for chest pain, palpitations and leg swelling.   Gastrointestinal:  Positive for nausea and vomiting. Negative for abdominal pain and diarrhea.   Genitourinary:  Negative for dysuria.   Musculoskeletal:  Positive for myalgias. Negative for neck pain.   Skin:  Negative for rash.   Neurological:  Negative for dizziness, syncope, weakness and light-headedness.        Objective   Vital Signs:   BP (!) 146/106 (BP Location: Left arm, Patient Position: Sitting, Cuff Size: Adult)   Pulse 98   Temp 98.4 °F (36.9 °C) (Oral)   Ht 175.3 cm (69.02\")   Wt 81.5 kg (179 lb 9.6 oz)   SpO2 96%   BMI 26.51 kg/m²          Physical Exam  Vitals and nursing note reviewed.   Constitutional:       Appearance: He is ill-appearing.   HENT:      Head: Normocephalic and atraumatic.      Right Ear: No drainage, swelling or tenderness. Tympanic membrane is not injected, erythematous, retracted or bulging.      Left Ear: No drainage, swelling or tenderness. Tympanic membrane is erythematous and bulging. Tympanic membrane is not retracted.      Nose: Congestion present. No mucosal edema or rhinorrhea.      Mouth/Throat:      Mouth: Mucous membranes are dry. No oral lesions.      Palate: No mass and lesions.      Pharynx: Uvula midline. Posterior oropharyngeal erythema and postnasal " drip present. No oropharyngeal exudate or uvula swelling.      Tonsils: No tonsillar exudate or tonsillar abscesses. 3+ on the right. 3+ on the left.   Eyes:      General: No scleral icterus.        Right eye: No discharge.         Left eye: No discharge.      Conjunctiva/sclera: Conjunctivae normal.      Pupils: Pupils are equal, round, and reactive to light.   Cardiovascular:      Rate and Rhythm: Normal rate and regular rhythm.      Heart sounds: Normal heart sounds.   Pulmonary:      Effort: No respiratory distress.      Breath sounds: Normal breath sounds. No stridor or decreased air movement. No decreased breath sounds, wheezing, rhonchi or rales.   Abdominal:      General: There is no distension.      Palpations: Abdomen is soft. There is no mass.      Tenderness: There is generalized abdominal tenderness. There is guarding. There is no rebound.      Comments: Moderate generalized abdominal tenderness with palpation. Guarding with palpation. Abdomen palpates soft.   Musculoskeletal:      Cervical back: Normal range of motion and neck supple.   Skin:     General: Skin is warm and dry.      Findings: Lesion present. No rash.      Comments: Raised dark lesion located on inner aspect of left upper extremity. Tiny dark slightly raised growth on left second finger, and flat dark growths on bilateral heels.      Neurological:      Mental Status: He is oriented to person, place, and time. He is lethargic.      Motor: No abnormal muscle tone.   Psychiatric:         Mood and Affect: Mood normal.         Behavior: Behavior normal.        Physical Exam        Results                 Assessment and Plan    Assessment & Plan      Assessment & Plan  Upper respiratory tract infection, unspecified type  Levaquin as directed  Increase water intake, stressed the importance of oral hydration  Get plenty of rest, monitor for temperature  Return if no improvement    Orders:    levoFLOXacin (Levaquin) 500 MG tablet; Take 1 tablet  by mouth Daily.    Productive cough  Increase water intake, stressed the importance of oral hydration  Get plenty of rest, monitor for temperature  Use a cool-mist humidifier  Return if no improvement    Orders:    CBC w AUTO Differential    Generalized abdominal pain  The patient was strongly urged several times to go to the emergency department for further evaluation of generalized abdominal pain, moderate abdominal tenderness with palpation and guarding, hematemesis with nausea, recent fever, and elevated blood pressure.  The patient declined to go to the emergency department.  Will order a stat CT of the abdomen and pelvis, urgent referral to Gastroenterology, and labs today.  Strong warning signs were given to the patient and his mother to report to the emergency department immediately for ongoing symptoms or for any worsening signs or symptoms.  Worsening signs and symptoms were reviewed with the patient today.    Orders:    CBC w AUTO Differential    Comprehensive metabolic panel    Lipase    Amylase    CT Abdomen Pelvis With Contrast    Ambulatory Referral to Gastroenterology    Hematemesis with nausea  Worsening-declined transfer to ED  Continue Pantoprazole daily and follow a GERD diet  Urgent referral to Gastroenterology for endoscopy  Declined transfer to the emergency department    Orders:    CBC w AUTO Differential    Comprehensive metabolic panel    Lipase    Amylase    CT Abdomen Pelvis With Contrast    Ambulatory Referral to Gastroenterology    Fever, unspecified fever cause    Orders:    CBC w AUTO Differential    CT Abdomen Pelvis With Contrast    Ambulatory Referral to Gastroenterology    Elevated blood pressure reading in office without diagnosis of hypertension  Decrease caffeine and sodium intake  Daily exercise, weight loss  Monitor BP at home and keep a log  Follow-up in 4 weeks for BP recheck    Orders:    Comprehensive metabolic panel    Skin lesions  Referral to Dermatology for further  evaluation  Orders:    Ambulatory Referral to Dermatology    Other acute nonsuppurative otitis media of left ear, recurrence not specified    Orders:    levoFLOXacin (Levaquin) 500 MG tablet; Take 1 tablet by mouth Daily.             Follow Up     Return in 4 weeks (on 12/3/2024) for Recheck blood pressure.    Patient was given instructions and counseling regarding his condition or for health maintenance advice. Please see specific information pulled into the AVS if appropriate.     -I strongly encouraged the patient several times to go to the emergency department for further evaluation-the patient and his mother declined.  -STAT CT abdomen/pelvis today.  -Hydrate well!  -Daily probiotic-clear this with Neurology.  -Urgent referral to Gastroenterology.  -Referral to Dermatology.  -F/U in 4 weeks for BP recheck.

## 2024-11-06 ENCOUNTER — TELEPHONE (OUTPATIENT)
Dept: FAMILY MEDICINE CLINIC | Facility: CLINIC | Age: 24
End: 2024-11-06
Payer: COMMERCIAL

## 2024-11-06 NOTE — TELEPHONE ENCOUNTER
PATIENT IS CALLING TO STATE HE WILL BE HERE IN 8 MINUTES TO  THE WORK EXCUSE.    UNABLE TO WARM TRANSFER.    PLEASE ADVISE.

## 2024-11-06 NOTE — TELEPHONE ENCOUNTER
Caller: Manuel Wood    Relationship: Self    Best call back number: [phone]     What form or medical record are you requesting: WORK EXCUSE    Who is requesting this form or medical record from you: [employer]    How would you like to receive the form or medical records (pick-up, mail, fax):     Timeframe paperwork needed: ASAP    Additional notes: PATIENT STATES THAT HE NEEDS TO GET A WORK EXCUSE FOR TODAY AND THEN TO RETURN TO WORK TOMORROW.     PATIENT WOULD LIKE TO COME BY AND GET THIS.

## 2024-11-19 ENCOUNTER — OFFICE VISIT (OUTPATIENT)
Dept: GASTROENTEROLOGY | Facility: CLINIC | Age: 24
End: 2024-11-19
Payer: COMMERCIAL

## 2024-11-19 ENCOUNTER — PREP FOR SURGERY (OUTPATIENT)
Dept: SURGERY | Facility: SURGERY CENTER | Age: 24
End: 2024-11-19
Payer: COMMERCIAL

## 2024-11-19 VITALS
HEIGHT: 69 IN | BODY MASS INDEX: 27.03 KG/M2 | HEART RATE: 79 BPM | TEMPERATURE: 98.2 F | WEIGHT: 182.5 LBS | SYSTOLIC BLOOD PRESSURE: 140 MMHG | OXYGEN SATURATION: 97 % | DIASTOLIC BLOOD PRESSURE: 82 MMHG

## 2024-11-19 DIAGNOSIS — K92.0 HEMATEMESIS WITH NAUSEA: Primary | ICD-10-CM

## 2024-11-19 DIAGNOSIS — R19.5 DARK STOOLS: ICD-10-CM

## 2024-11-19 DIAGNOSIS — R10.84 GENERALIZED ABDOMINAL PAIN: ICD-10-CM

## 2024-11-19 DIAGNOSIS — K76.0 FATTY LIVER: ICD-10-CM

## 2024-11-19 PROCEDURE — 99214 OFFICE O/P EST MOD 30 MIN: CPT | Performed by: NURSE PRACTITIONER

## 2024-11-19 RX ORDER — SODIUM CHLORIDE 0.9 % (FLUSH) 0.9 %
10 SYRINGE (ML) INJECTION AS NEEDED
OUTPATIENT
Start: 2024-11-19

## 2024-11-19 RX ORDER — SODIUM CHLORIDE 0.9 % (FLUSH) 0.9 %
3 SYRINGE (ML) INJECTION EVERY 12 HOURS SCHEDULED
OUTPATIENT
Start: 2024-11-19

## 2024-11-19 RX ORDER — SODIUM CHLORIDE, SODIUM LACTATE, POTASSIUM CHLORIDE, CALCIUM CHLORIDE 600; 310; 30; 20 MG/100ML; MG/100ML; MG/100ML; MG/100ML
30 INJECTION, SOLUTION INTRAVENOUS CONTINUOUS PRN
OUTPATIENT
Start: 2024-11-19 | End: 2024-11-19

## 2024-11-19 NOTE — PATIENT INSTRUCTIONS
Schedule EGD for further evaluation of symptoms.     Schedule gastric emptying study to assess for gastroparesis.     For fatty liver, weight loss is recommended. Recommend following a low fat and low sugar diet. Recommend management of diabetes and elevated cholesterol with primary care provider if indicated. Regular exercise is recommended. Alcohol avoidance is recommended.

## 2024-11-19 NOTE — PROGRESS NOTES
"Chief Complaint   Patient presents with    Vomiting         History of Present Illness  Patient is a 24-year-old male who presents today for evaluation, referred for abdominal pain, hematemesis, and fever. Workup has included a CT scan that showed no acute findings, hepatic steatosis.    Patient presents today for evaluation with concerns about vomiting.  Reports this has been present for years.  It occurs on a daily basis.  Emesis at times contains acid and other times contains undigested food.  He had 1 episode where he had a large amount of hematemesis and has had additional episodes where he had blood-tinged emesis.    He denies any dysphagia.    He has been taking pantoprazole for reflux which helps somewhat but no longer seems to be working as effectively.    His bowels are moving regularly.  Reports he has had episodes of dark stools.    He uses cannabis on a daily basis.  He rarely drinks alcohol.    His grandmother had colon cancer and his mother has had polyps.    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     Result Review :       Lipase (11/05/2024 12:09)    Amylase (11/05/2024 12:09)    CBC w AUTO Differential (11/05/2024 12:09)    Comprehensive metabolic panel (11/05/2024 12:09)    CT Abdomen Pelvis With Contrast (11/05/2024 12:32)    Office Visit with Nette Sarkar APRN (11/05/2024)    Referral to Gastroenterology for Generalized abdominal pain; Hematemesis with nausea; Fever, unspecified fever cause (11/05/2024)    Vital Signs:   /82   Pulse 79   Temp 98.2 °F (36.8 °C)   Ht 175.3 cm (69\")   Wt 82.8 kg (182 lb 8 oz)   SpO2 97%   BMI 26.95 kg/m²     Body mass index is 26.95 kg/m².     Physical Exam  Vitals reviewed.   Constitutional:       General: He is not in acute distress.     Appearance: He is well-developed.   HENT:      Head: Normocephalic and atraumatic.   Pulmonary:      Effort: Pulmonary effort is normal. " No respiratory distress.   Abdominal:      General: Abdomen is flat. Bowel sounds are normal. There is no distension.      Palpations: Abdomen is soft.      Tenderness: There is no abdominal tenderness.   Skin:     General: Skin is dry.      Coloration: Skin is not pale.   Neurological:      Mental Status: He is alert and oriented to person, place, and time.   Psychiatric:         Thought Content: Thought content normal.           Assessment and Plan    Diagnoses and all orders for this visit:    1. Hematemesis with nausea (Primary)  -     NM Gastric Emptying; Future    2. Generalized abdominal pain  -     NM Gastric Emptying; Future    3. Dark stools    4. Fatty liver         Discussion  Patient presents today for evaluation with concerns about persistent vomiting.  Recommended EGD to assess for any evidence of peptic ulcer disease, gastritis, gastric outlet obstruction, or H. pylori infection that could be contributing to symptoms.    We will also schedule gastric emptying study to evaluate for gastroparesis.    We discussed possibility of cannabis hyperemesis syndrome and recommended discontinuing cannabis.  Patient had previously done so for 1 month and then around 3 months with no improvement in symptoms.    Regarding fatty liver, reviewed diet and lifestyle modifications to help with this. May consider FibroSure or FibroScan in the future for fibrosis assessment.          Follow Up   Return for Follow up to review results after testing complete.    Patient Instructions   Schedule EGD for further evaluation of symptoms.     Schedule gastric emptying study to assess for gastroparesis.     For fatty liver, weight loss is recommended. Recommend following a low fat and low sugar diet. Recommend management of diabetes and elevated cholesterol with primary care provider if indicated. Regular exercise is recommended. Alcohol avoidance is recommended.

## 2024-12-06 ENCOUNTER — OUTSIDE FACILITY SERVICE (OUTPATIENT)
Dept: GASTROENTEROLOGY | Facility: CLINIC | Age: 24
End: 2024-12-06
Payer: COMMERCIAL

## 2024-12-06 ENCOUNTER — LAB REQUISITION (OUTPATIENT)
Dept: LAB | Facility: HOSPITAL | Age: 24
End: 2024-12-06
Payer: COMMERCIAL

## 2024-12-06 DIAGNOSIS — K21.9 GASTRO-ESOPHAGEAL REFLUX DISEASE WITHOUT ESOPHAGITIS: ICD-10-CM

## 2024-12-06 PROCEDURE — 88312 SPECIAL STAINS GROUP 1: CPT | Performed by: INTERNAL MEDICINE

## 2024-12-06 PROCEDURE — 88305 TISSUE EXAM BY PATHOLOGIST: CPT | Performed by: INTERNAL MEDICINE

## 2024-12-06 PROCEDURE — 43239 EGD BIOPSY SINGLE/MULTIPLE: CPT | Performed by: INTERNAL MEDICINE

## 2024-12-10 LAB
CYTO UR: NORMAL
LAB AP CASE REPORT: NORMAL
LAB AP DIAGNOSIS COMMENT: NORMAL
PATH REPORT.FINAL DX SPEC: NORMAL
PATH REPORT.GROSS SPEC: NORMAL

## 2025-02-17 DIAGNOSIS — K21.9 GASTROESOPHAGEAL REFLUX DISEASE, UNSPECIFIED WHETHER ESOPHAGITIS PRESENT: ICD-10-CM

## 2025-02-17 RX ORDER — PANTOPRAZOLE SODIUM 40 MG/1
40 TABLET, DELAYED RELEASE ORAL DAILY
Qty: 90 TABLET | Refills: 0 | Status: SHIPPED | OUTPATIENT
Start: 2025-02-17

## 2025-02-17 NOTE — TELEPHONE ENCOUNTER
Rx Refill Note  Requested Prescriptions     Pending Prescriptions Disp Refills    pantoprazole (PROTONIX) 40 MG EC tablet [Pharmacy Med Name: PANTOPRAZOLE 40MG TABLETS] 90 tablet 0     Sig: TAKE 1 TABLET BY MOUTH DAILY      Last office visit with prescribing clinician: 11/5/2024   Last telemedicine visit with prescribing clinician: Visit date not found   Next office visit with prescribing clinician: Visit date not found                         Would you like a call back once the refill request has been completed: [] Yes [] No    If the office needs to give you a call back, can they leave a voicemail: [] Yes [] No    Stacey Aleman MA  02/17/25, 16:40 EST

## 2025-03-06 ENCOUNTER — READMISSION MANAGEMENT (OUTPATIENT)
Dept: CALL CENTER | Facility: HOSPITAL | Age: 25
End: 2025-03-06
Payer: COMMERCIAL

## 2025-03-06 NOTE — OUTREACH NOTE
Prep Survey      Flowsheet Row Responses   [facility] facility patient discharged from? Non-BH   Is LACE score < 7 ? Non- Discharge   Eligibility [facility]   Date of Admission 03/04/25   Date of Discharge 03/06/25   Discharge Disposition Home or Self Care   Discharge diagnosis Right sided abdominal pain (Primary Dx),  Leg numbness,  Right leg paresthesias   Does the patient have one of the following disease processes/diagnoses(primary or secondary)? Other   Prep survey completed? Yes            Clarita STOUT - Registered Nurse

## 2025-03-07 ENCOUNTER — TRANSITIONAL CARE MANAGEMENT TELEPHONE ENCOUNTER (OUTPATIENT)
Dept: CALL CENTER | Facility: HOSPITAL | Age: 25
End: 2025-03-07
Payer: COMMERCIAL

## 2025-03-07 NOTE — OUTREACH NOTE
Call Center TCM Note      Flowsheet Row Responses   [facility] [place] [facility] from? Non-  [[facility]]   Does the patient have one of the following disease processes/diagnoses(primary or secondary)? Other   TCM attempt successful? No   Unsuccessful attempts Attempt 2  [attempted both numbers listed]            Constance Titus RN    3/7/2025, 15:46 EST

## 2025-03-07 NOTE — OUTREACH NOTE
Call Center TCM Note      Flowsheet Row Responses   [facility] facility patient discharged from? Non-BH  [[facility]]   Does the patient have one of the following disease processes/diagnoses(primary or secondary)? Other   TCM attempt successful? No  [VR for Isi, mother]   Unsuccessful attempts Attempt 1   Call Status Left message            Constance Titus, RN    3/7/2025, 10:01 EST

## 2025-03-08 ENCOUNTER — TRANSITIONAL CARE MANAGEMENT TELEPHONE ENCOUNTER (OUTPATIENT)
Dept: CALL CENTER | Facility: HOSPITAL | Age: 25
End: 2025-03-08
Payer: COMMERCIAL

## 2025-03-08 NOTE — OUTREACH NOTE
Call Center TCM Note      Flowsheet Row Responses   [facility] patient discharged from? Non-   Does the patient have one of the following disease processes/diagnoses(primary or secondary)? Other   TCM attempt successful? Yes   Call start time 1222   Call end time 1224   Discharge diagnosis Right sided abdominal pain (Primary Dx),  Leg numbness,  Right leg paresthesias   Meds reviewed with patient/caregiver? Yes   Is the patient having any side effects they believe may be caused by any medication additions or changes? No   Does the patient have all medications ordered at discharge? Yes   Is the patient taking all medications as directed (includes completed medication regime)? Yes   Does the patient have an appointment with their PCP within 7-14 days of discharge? No   Nursing Interventions Patient declined scheduling/rescheduling appointment at this time, Routed TCM call to PCP office   Has home health visited the patient within 72 hours of discharge? N/A   Psychosocial issues? No   Did the patient receive a copy of their discharge instructions? Yes   Nursing interventions Reviewed instructions with patient   What is the patient's perception of their health status since discharge? Improving   Is the patient/caregiver able to teach back signs and symptoms related to disease process for when to call PCP? Yes   Is the patient/caregiver able to teach back signs and symptoms related to disease process for when to call 911? Yes   Is the patient/caregiver able to teach back the hierarchy of who to call/visit for symptoms/problems? PCP, Specialist, Home health nurse, Urgent Care, ED, 911 Yes   If the patient is a current smoker, are they able to teach back resources for cessation? Not a smoker   TCM call completed? Yes   Call end time 1224            Mayra Garcia RN    3/8/2025, 12:24 EST

## 2025-03-28 DIAGNOSIS — R11.2 NAUSEA AND VOMITING, UNSPECIFIED VOMITING TYPE: Primary | ICD-10-CM

## 2025-03-28 RX ORDER — SUCRALFATE 1 G/1
1 TABLET ORAL 4 TIMES DAILY
Qty: 16 TABLET | Refills: 0 | Status: SHIPPED | OUTPATIENT
Start: 2025-03-28 | End: 2025-03-30 | Stop reason: SDUPTHER

## 2025-03-30 NOTE — PROGRESS NOTES
Subjective   Manuel Wood is a 24 y.o. male.     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 Today he had numbness in the right lower extremity circumferentially. He already has a small amount of weakness in the bilateral lower extremities mostly noticed when ambulating. He has chronic left lower extremity numbness that he states occurred after surgery as mentioned above. He also has right flank pain that has moved to the right upper quadrant, left upper quadrant and epigastric areas. No nausea or vomiting. No dizziness or syncope. The pain is moderate and dull. No aggravating alleviating factors noted. He has some dysuria. He noticed some blood in his urine at 1 point recently. No fevers or chills. No cough or shortness of air. No chest pain.    CTs were negative and Gastro and Neurosurgery were consulted. GI note was as follows:    Manuel Wood is a 24 yr/o male h/o GERD, ADHD, CCM, admitted to [facility] with complaints of upper abdominal pain, nausea and vomiting. Now with burning pain in his esophagus and stomach, worsened with PO intake. CT AP unremarkable.    Mother reports they sought a second opinion following scopes with Dr. Gomez in 12/2023. Recently had repeat EGD by Dr. Franklin on 12/6/24 that revealed esophagitis and gastritis per mother. Was advised to make lifestyle changes for GERD which he has not done. Still eats pizza, drinks cokes, eats right before bed, etc. Was also advised to quit smoking marijuana for at least 6 weeks to see if  symptoms improve. Urine tox + for cannabinoids (continues to smoke marijuana daily despite being advised this is likely contributing to his symptoms).  joel Protonix 40mg once daily in the morning. Has taken Carafate in the past which helped but none recently.    MRIs were stable.    ASSESSMENT:  Burning epigastric pain 2/2 known reflux esophagitis seen on recent EGD (per mother - no report in EMR other than path as this was performed by Dr. Franklin at [facility]). Has not enacted any recommended lifestyle changes. Continues to smoke marijuana daily despite being advised this is likely contributing to his symptoms as well.    PLAN:  - Protonix 40mg BID x 8 weeks, then can decrease to once daily - advised him to take 30mins to 1hr before meals on an empty stomach  - Carafate slurry (1g/10mL) x 10 days  - If he continues to have nocturnal symptoms / awakens with GERD - can trial Pepcid 20mg right before bedtime in addition to above  - Will lower to full liquid diet per his request, can increase back to GI soft as tolerates  - Long discussion regarding lifestyle changes with GERD along with absolute cessation of marijuana for at least 6 weeks  - Mother states they will follow up with [facility] GI upon D/C    Patient is a 24-year-old male who presents to the emergency department with 3 days of right-sided abdominal pain. Patient evaluated with blood work and imaging in the emergency department. Simultaneously neurosurgery, Rogerio HUERTA was consulted in the emergency department. Recommends admission to hospitalist for MRIs of the head to lumbar with and without contrast. No indication for emergent transfer to [place] at this time. CT scan of the abdomen pelvis without emergent findings. Plan for admission the hospital for further care and evaluation. Discussed all available laboratory and imaging findings with patient. Given multiple doses of pain medicine. Patient/family comfortable with admission for further  "care.    Neurosurgery note as follows:    Patient is well-known to our neurosurgical practice from previous thoracic laminectomy and resection of cavernous malformation. He presented to the emergency room with abdominal pain, flank pain, and right lower extremity numbness. No reported weakness on exam. Discussed patient with Deja Head nurse practitioner. Dr. Torres and myself reviewed the MRI of the brain and spinal axis. Patient has numerous cavernous malformations within the brain which appear stable in comparison to his most recent scan from February of last year. MRI of the cervical and thoracic spine without acute findings. Postoperative changes at T8-T10 from previous laminectomy and resection of cavernous malformation.    Unclear etiology of the right lower extremity numbness. Imaging is reassuring as there is no specific structural abnormality that would warrant surgical intervention. Further workup of abdominal and flank pain per primary team. Patient can follow-up in our neurosurgery clinic as an outpatient    Current outpatient and discharge medications have been reconciled for the patient.  Reviewed by: Timoteo Cui MD      Pt then back to the ED on 3/21 and 3/24 for COVID, treated with supportive care and Medrol.    Pt also reports some decreased hearing with time.           The following portions of the patient's history were reviewed and updated as appropriate: allergies, current medications, past family history, past medical history, past social history, past surgical history, and problem list.    Review of Systems   Constitutional:  Negative for activity change, chills and fever.   Respiratory:  Negative for cough.    Cardiovascular:  Negative for chest pain.   Psychiatric/Behavioral:  Negative for dysphoric mood.        /80   Pulse 75   Temp 98.4 °F (36.9 °C) (Oral)   Resp 16   Ht 175.3 cm (69\")   Wt 85.7 kg (189 lb)   SpO2 99%   BMI 27.91 kg/m²     Objective   Physical Exam  Vitals " and nursing note reviewed.   Constitutional:       General: He is not in acute distress.     Appearance: He is well-developed.   Cardiovascular:      Rate and Rhythm: Normal rate and regular rhythm.   Pulmonary:      Effort: Pulmonary effort is normal.      Breath sounds: Normal breath sounds.   Abdominal:      General: Abdomen is flat.      Palpations: Abdomen is soft.   Neurological:      Mental Status: He is alert and oriented to person, place, and time.   Psychiatric:         Behavior: Behavior normal.         Thought Content: Thought content normal.     Hospital records reviewed with pt confirming HPI.      Assessment & Plan   Diagnoses and all orders for this visit:    1. Right leg numbness (Primary)  -     Ambulatory Referral to Physical Therapy for Evaluation & Treatment    2. Gastroesophageal reflux disease, unspecified whether esophagitis present  -     sucralfate (Carafate) 1 g tablet; Take 1 tablet by mouth 4 (Four) Times a Day.  Dispense: 40 tablet; Refill: 1  -     pantoprazole (PROTONIX) 40 MG EC tablet; Take 1 tablet by mouth 2 (Two) Times a Day.  Dispense: 180 tablet; Refill: 1    3. Epigastric pain  -     sucralfate (Carafate) 1 g tablet; Take 1 tablet by mouth 4 (Four) Times a Day.  Dispense: 40 tablet; Refill: 1    4. Hospital discharge follow-up    5. Other gastritis without hemorrhage, unspecified chronicity  -     pantoprazole (PROTONIX) 40 MG EC tablet; Take 1 tablet by mouth 2 (Two) Times a Day.  Dispense: 180 tablet; Refill: 1    6. Other specified hearing loss of both ears  -     Ambulatory Referral to ENT (Otolaryngology)    Pt encouraged to keep all of his specialist f/u appts and to make the above-mentioned lifestyle changes.  Elevation of HOB discussed.

## 2025-03-31 ENCOUNTER — OFFICE VISIT (OUTPATIENT)
Dept: FAMILY MEDICINE CLINIC | Facility: CLINIC | Age: 25
End: 2025-03-31
Payer: COMMERCIAL

## 2025-03-31 VITALS
HEIGHT: 69 IN | HEART RATE: 75 BPM | TEMPERATURE: 98.4 F | WEIGHT: 189 LBS | DIASTOLIC BLOOD PRESSURE: 80 MMHG | OXYGEN SATURATION: 99 % | RESPIRATION RATE: 16 BRPM | SYSTOLIC BLOOD PRESSURE: 128 MMHG | BODY MASS INDEX: 27.99 KG/M2

## 2025-03-31 DIAGNOSIS — H91.8X3 OTHER SPECIFIED HEARING LOSS OF BOTH EARS: ICD-10-CM

## 2025-03-31 DIAGNOSIS — K21.9 GASTROESOPHAGEAL REFLUX DISEASE, UNSPECIFIED WHETHER ESOPHAGITIS PRESENT: ICD-10-CM

## 2025-03-31 DIAGNOSIS — K29.60 OTHER GASTRITIS WITHOUT HEMORRHAGE, UNSPECIFIED CHRONICITY: ICD-10-CM

## 2025-03-31 DIAGNOSIS — Z09 HOSPITAL DISCHARGE FOLLOW-UP: ICD-10-CM

## 2025-03-31 DIAGNOSIS — R20.0 RIGHT LEG NUMBNESS: Primary | ICD-10-CM

## 2025-03-31 DIAGNOSIS — R10.13 EPIGASTRIC PAIN: ICD-10-CM

## 2025-03-31 PROBLEM — K29.70 GASTRITIS WITHOUT BLEEDING: Status: ACTIVE | Noted: 2025-03-31

## 2025-03-31 RX ORDER — SUCRALFATE 1 G/1
1 TABLET ORAL 4 TIMES DAILY
Qty: 40 TABLET | Refills: 1 | Status: SHIPPED | OUTPATIENT
Start: 2025-03-31

## 2025-03-31 RX ORDER — PANTOPRAZOLE SODIUM 40 MG/1
40 TABLET, DELAYED RELEASE ORAL 2 TIMES DAILY
Qty: 180 TABLET | Refills: 1 | Status: SHIPPED | OUTPATIENT
Start: 2025-03-31

## 2025-03-31 RX ORDER — METHYLPREDNISOLONE 4 MG/1
TABLET ORAL
COMMUNITY
Start: 2025-03-24

## 2025-04-01 ENCOUNTER — TELEPHONE (OUTPATIENT)
Dept: FAMILY MEDICINE CLINIC | Facility: CLINIC | Age: 25
End: 2025-04-01
Payer: COMMERCIAL

## 2025-05-31 NOTE — PROGRESS NOTES
"Subjective   Manuel Wood is a 24 y.o. male.     CC: Circulation Concerns    History of Present Illness     Pt comes in today reporting issues with maintaining erections for \"some time now\", and reports his legs feel \"cold\" from time to time. Ongoing at least a month.     Patient does have a history of multiple cavernous malformations and previous back issues/surgery, with most recent MRI from 3/4/2025 as follows:    MRI BRAIN:  1.Again noted are numerous cavernous malformations within the  infratentorial and supratentorial brain, as well as the brainstem,  each demonstrating peripheral blushes of enhancement and some  demonstrating intrinsic T1 hyperintense signal. Overall, findings are  without significant interval change from February 6, 2024. No evidence  of any acute intraparenchymal hematoma.    MRI CERVICAL AND THORACIC SPINE:  1.Again noted are postsurgical changes related to T8-T10 laminectomy  and resection of the cavernous malformation at the T9 level.  2.Thoracic and cervical cord otherwise returns normal signal with no  pathologic enhancement. No findings to suggest residual or recurrent  cavernous hemangioma in the imaged spinal cord.    Patient is under the care of of neurology/neurosurgeon and is up-to-date.    Patient is also doing well with his Carafate and needs a refill today.    The following portions of the patient's history were reviewed and updated as appropriate: allergies, current medications, past family history, past medical history, past social history, past surgical history, and problem list.    Review of Systems   Constitutional:  Negative for activity change, chills and fever.   Respiratory:  Negative for cough.    Cardiovascular:  Negative for chest pain.   Psychiatric/Behavioral:  Negative for dysphoric mood.        /62   Pulse 78   Temp 98.3 °F (36.8 °C) (Oral)   Resp 16   Ht 175.3 cm (69\")   Wt 73.5 kg (162 lb)   SpO2 97%   BMI 23.92 kg/m²     Objective   Physical " Exam  Vitals and nursing note reviewed.   Constitutional:       General: He is not in acute distress.     Appearance: He is well-developed.   Cardiovascular:      Pulses:           Popliteal pulses are 2+ on the right side and 2+ on the left side.        Dorsalis pedis pulses are 2+ on the right side and 2+ on the left side.   Pulmonary:      Effort: Pulmonary effort is normal.   Neurological:      Mental Status: He is alert and oriented to person, place, and time.   Psychiatric:         Behavior: Behavior normal.         Thought Content: Thought content normal.         Assessment & Plan   Diagnoses and all orders for this visit:    1. Other male erectile dysfunction (Primary)  -     sildenafil (Viagra) 100 MG tablet; Take 1 tablet by mouth Daily As Needed for Erectile Dysfunction.  Dispense: 30 tablet; Refill: 11    2. Gastroesophageal reflux disease, unspecified whether esophagitis present  -     sucralfate (Carafate) 1 g tablet; Take 1 tablet by mouth 4 (Four) Times a Day.  Dispense: 40 tablet; Refill: 1    3. Epigastric pain  -     sucralfate (Carafate) 1 g tablet; Take 1 tablet by mouth 4 (Four) Times a Day.  Dispense: 40 tablet; Refill: 1    Patient's blood flow is excellent and do not feel this is an issue.  Feel all of these issues are nerve related, and will encourage patient to discuss this with both his neurologist and his neurosurgeon for further management.

## 2025-06-02 ENCOUNTER — OFFICE VISIT (OUTPATIENT)
Dept: FAMILY MEDICINE CLINIC | Facility: CLINIC | Age: 25
End: 2025-06-02
Payer: COMMERCIAL

## 2025-06-02 VITALS
OXYGEN SATURATION: 97 % | BODY MASS INDEX: 23.99 KG/M2 | WEIGHT: 162 LBS | HEART RATE: 78 BPM | RESPIRATION RATE: 16 BRPM | DIASTOLIC BLOOD PRESSURE: 62 MMHG | TEMPERATURE: 98.3 F | SYSTOLIC BLOOD PRESSURE: 112 MMHG | HEIGHT: 69 IN

## 2025-06-02 DIAGNOSIS — K21.9 GASTROESOPHAGEAL REFLUX DISEASE, UNSPECIFIED WHETHER ESOPHAGITIS PRESENT: ICD-10-CM

## 2025-06-02 DIAGNOSIS — R10.13 EPIGASTRIC PAIN: ICD-10-CM

## 2025-06-02 DIAGNOSIS — N52.8 OTHER MALE ERECTILE DYSFUNCTION: Primary | ICD-10-CM

## 2025-06-02 PROCEDURE — 99214 OFFICE O/P EST MOD 30 MIN: CPT | Performed by: FAMILY MEDICINE

## 2025-06-02 RX ORDER — SUCRALFATE 1 G/1
1 TABLET ORAL 4 TIMES DAILY
Qty: 40 TABLET | Refills: 1 | OUTPATIENT
Start: 2025-06-02

## 2025-06-02 RX ORDER — SILDENAFIL 100 MG/1
100 TABLET, FILM COATED ORAL DAILY PRN
Qty: 30 TABLET | Refills: 11 | Status: SHIPPED | OUTPATIENT
Start: 2025-06-02

## 2025-06-02 RX ORDER — SUCRALFATE 1 G/1
1 TABLET ORAL 4 TIMES DAILY
Qty: 40 TABLET | Refills: 1 | Status: SHIPPED | OUTPATIENT
Start: 2025-06-02

## 2025-06-02 RX ORDER — SUCRALFATE 1 G/1
1 TABLET ORAL 4 TIMES DAILY
Qty: 40 TABLET | Refills: 1 | Status: CANCELLED | OUTPATIENT
Start: 2025-06-02

## 2025-06-02 NOTE — TELEPHONE ENCOUNTER
Caller: Manuel Wood    Relationship: Self    Best call back number: [phone]     Requested Prescriptions:   Requested Prescriptions     Pending Prescriptions Disp Refills    sucralfate (Carafate) 1 g tablet 40 tablet 1     Sig: Take 1 tablet by mouth 4 (Four) Times a Day.        Pharmacy where request should be sent: [id number] - [place] [id number] [facility] AT [facility] - [phone]  - [phone] FX     Last office visit with prescribing clinician: 6/2/2025   Last telemedicine visit with prescribing clinician: Visit date not found   Next office visit with prescribing clinician: Visit date not found     Additional details provided by patient: PATIENT IS OUT OF THIS AND STATES HE NEEDS IT URGENTLY    Does the patient have less than a 3 day supply:  [x] Yes  [] No    Codi Rodgers Rep   06/02/25 13:56 EDT

## 2025-06-02 NOTE — ADDENDUM NOTE
Addended by: DIMPLE GUILLORY on: 6/2/2025 02:32 PM     Modules accepted: Orders, Level of Service

## 2025-06-12 DIAGNOSIS — R11.2 NAUSEA AND VOMITING, UNSPECIFIED VOMITING TYPE: ICD-10-CM

## 2025-06-12 RX ORDER — ONDANSETRON 4 MG/1
4 TABLET, ORALLY DISINTEGRATING ORAL EVERY 8 HOURS PRN
Qty: 30 TABLET | Refills: 1 | Status: SHIPPED | OUTPATIENT
Start: 2025-06-12

## 2025-06-17 NOTE — PROGRESS NOTES
Chief Complaint   Patient presents with    Vomiting    Weight Loss           History of Present Illness  History of Present Illness  The patient presents for follow-up of chronic vomiting.  He is here with his mother.  He has history of GERD with esophagitis and fatty liver.  He has Cerebral Cavernous Malformations (CCM3) which causes brain and spinal cord lesions, and can compromise gut health.  These lesions can bleed.  He gets annual brain/spine MRIs.    Recent EGD showed LA grade B esophagitis.  CT scan negative.  Lab work normal.    Patient reports frequent vomiting since he was a child.  It has gradually worsened over the past year.  He vomits every day within 30 minutes of waking up, reports multiple episodes of vomiting in quick succession and then feels fine the rest of the day.  Emesis consist of stomach acid or food from the day before.  He has vomited blood in the past but not recently.  He has acid reflux and heartburn secondary to the vomiting.  He can have abdominal discomfort but denies pain.  Noted that weight is down 20 to 30 pounds in the past few months, patient notes that he has been more physically active but is surprised by this weight loss.  Sometimes he has mild constipation which improves with prunes.    He smokes marijuana almost daily.  Vomiting was a chronic issue prior to when he started marijuana use.  He did eliminate marijuana use for several weeks but denies improvement of symptoms.  He feels that marijuana helps to settle his stomach and increase his appetite.  We discussed possibility of hyperemesis cannabinoid syndrome.    He has poor sleeping habits, often sleeps until the afternoon and stays up late.  Largest meal is typically dinner and he eats his soon before going to bed.  He has tried propping up his head of bed to sleep.  He has tried eliminating various foods from diet.  Sucralfate helped some initially but now is not helpful.  He has tried pantoprazole and famotidine in  the past.  He has not tried Reglan.  We discussed possible side effect of tardive dyskinesia, he states that he can get shaky after vomiting which he thinks is due to low blood sugar.    His grandmother had colon cancer and his mother has had polyps.     Result Review :      Progress Notes by Sarah Oconnor APRN (11/19/2024 14:15)  c/o vomiting.  Recommended EGD and GES.  Hyperemesis cannabinoid?  THC cessation.  Fatty liver.    CBC AND DIFFERENTIAL (03/04/2025 16:05) normal  COMPREHENSIVE METABOLIC PANEL (03/04/2025 16:05) normal  LIPASE (03/04/2025 16:05) normal    CT Abdomen Pelvis With Contrast (03/04/2025 17:52) negative  CT Abdomen Pelvis With Contrast (11/05/2024 12:32) hepatic steatosis    Endoscopy, Int (12/06/2024) LA grade B reflux esophagitis, normal stomach and duodenum  Tissue Pathology Exam (12/06/2024 13:00) benign  SCANNED - COLONOSCOPY (12/20/2023) IH  ENDOSCOPY, INT (12/20/2023) LA grade a reflux esophagitis, gastritis, normal duodenum    Medication list reviewed        Review of Systems   Constitutional:  Positive for unexpected weight change. Negative for appetite change.   HENT: Negative.     Eyes: Negative.    Respiratory: Negative.     Cardiovascular: Negative.    Gastrointestinal:  Positive for constipation, nausea and vomiting. Negative for abdominal distention, abdominal pain, blood in stool and diarrhea.   Endocrine: Negative.    Genitourinary: Negative.    Musculoskeletal: Negative.    Skin: Negative.    Allergic/Immunologic: Negative.    Neurological: Negative.    Hematological: Negative.    Psychiatric/Behavioral: Negative.           Vital Signs:   /78   Pulse 73   Temp 97 °F (36.1 °C)   Wt 72.5 kg (159 lb 14.4 oz)   SpO2 98%   BMI 23.61 kg/m²     Body mass index is 23.61 kg/m².     Physical Exam  Vitals reviewed.   Constitutional:       Appearance: Normal appearance.   HENT:      Head: Normocephalic.      Nose: Nose normal.   Eyes:      Pupils: Pupils are equal, round,  and reactive to light.   Cardiovascular:      Rate and Rhythm: Normal rate.      Pulses: Normal pulses.   Pulmonary:      Effort: Pulmonary effort is normal.      Breath sounds: Normal breath sounds.   Abdominal:      Palpations: Abdomen is soft.   Musculoskeletal:         General: Normal range of motion.      Cervical back: Normal range of motion.   Skin:     General: Skin is warm and dry.   Neurological:      General: No focal deficit present.      Mental Status: He is alert and oriented to person, place, and time.   Psychiatric:         Mood and Affect: Mood normal.             Assessment and Plan    Diagnoses and all orders for this visit:    1. Nausea and vomiting, unspecified vomiting type (Primary)  -     NM Gastric Emptying; Future  -     Breath Hydrogen Test  -     famotidine (PEPCID) 40 MG tablet; Take 1 tablet by mouth Every Night.  Dispense: 30 tablet; Refill: 5    2. Gastroesophageal reflux disease with esophagitis without hemorrhage  -     famotidine (PEPCID) 40 MG tablet; Take 1 tablet by mouth Every Night.  Dispense: 30 tablet; Refill: 5    3. Fatty liver    4. Constipation, unspecified constipation type    5. Weight loss         Discussion:  Patient presents for follow-up of chronic vomiting.  EGD showed grade B esophagitis.  CT and lab work negative.  Recommend gastric emptying study to evaluate for gastroparesis and hydrogen breath test to evaluate for SIBO.    For now we will continue pantoprazole 40 mg daily and add famotidine 40 mg within 1 hour of going to bed.  He may take sucralfate as needed.  We discussed dietary modifications, breakfast should be his largest meal and dinner his smallest meal, and he needs to allow 3 to 4 hours between his last meal and bedtime.  He can incorporate protein shakes and should monitor his weight.      We discussed marijuana use, he feels that marijuana does not cause his symptoms and actually may help his appetite and settle his stomach.  Discussed potential  of developing hyperemesis cannabinoid syndrome.    He may have element of cyclic vomiting syndrome, consider trial of tricyclic antidepressant. Consider Voquezna, Reglan, or scopolamine in the future      PLAN - GES, SIBO, PPI +Pepcid, Sucralfate  T/C - Voquezna, Reglan, Scopolamine, TCA    Return in about 2 months (around 8/18/2025).       Patient Instructions   Schedule gastric emptying study to assess for gastroparesis.    Schedule hydrogen breath test to assess for small intestinal bacterial overgrowth.    Continue Pantoprazole 40 mg when you wake up  Allow 3-4 hours between last meal and bedtime   Take Famotidine 40 mg one hour before bedtime   Take Sucalfate as needed    If no improvement after one month, we can try a different medication called Voquezna       For constipation continue prunes or add Miralax or Colace          I spent 40 minutes caring for Manuel on this date of service. This time includes time spent by me in the following activities:preparing for the visit, reviewing tests, obtaining and/or reviewing a separately obtained history, performing a medically appropriate examination and/or evaluation , counseling and educating the patient/family/caregiver, ordering medications, tests, or procedures, referring and communicating with other health care professionals , documenting information in the medical record, independently interpreting results and communicating that information with the patient/family/caregiver, and care coordination  Patient or patient representative verbalized consent for the use of Ambient Listening during the visit with  DEANNA Yoder for chart documentation. 6/18/2025  09:49 EDT            DEANNA Hawk  [facility] [place] [address]  Office: [phone]

## 2025-06-18 ENCOUNTER — OFFICE VISIT (OUTPATIENT)
Dept: GASTROENTEROLOGY | Facility: CLINIC | Age: 25
End: 2025-06-18
Payer: COMMERCIAL

## 2025-06-18 VITALS
BODY MASS INDEX: 23.61 KG/M2 | DIASTOLIC BLOOD PRESSURE: 78 MMHG | OXYGEN SATURATION: 98 % | TEMPERATURE: 97 F | SYSTOLIC BLOOD PRESSURE: 122 MMHG | WEIGHT: 159.9 LBS | HEART RATE: 73 BPM

## 2025-06-18 DIAGNOSIS — R11.2 NAUSEA AND VOMITING, UNSPECIFIED VOMITING TYPE: Primary | Chronic | ICD-10-CM

## 2025-06-18 DIAGNOSIS — K76.0 FATTY LIVER: Chronic | ICD-10-CM

## 2025-06-18 DIAGNOSIS — K59.00 CONSTIPATION, UNSPECIFIED CONSTIPATION TYPE: Chronic | ICD-10-CM

## 2025-06-18 DIAGNOSIS — K21.00 GASTROESOPHAGEAL REFLUX DISEASE WITH ESOPHAGITIS WITHOUT HEMORRHAGE: ICD-10-CM

## 2025-06-18 DIAGNOSIS — R63.4 WEIGHT LOSS: ICD-10-CM

## 2025-06-18 RX ORDER — FAMOTIDINE 40 MG/1
40 TABLET, FILM COATED ORAL NIGHTLY
Qty: 30 TABLET | Refills: 5 | Status: SHIPPED | OUTPATIENT
Start: 2025-06-18

## 2025-06-18 NOTE — PATIENT INSTRUCTIONS
Schedule gastric emptying study to assess for gastroparesis.    Schedule hydrogen breath test to assess for small intestinal bacterial overgrowth.    Continue Pantoprazole 40 mg when you wake up  Allow 3-4 hours between last meal and bedtime   Take Famotidine 40 mg one hour before bedtime   Take Sucalfate as needed    If no improvement after one month, we can try a different medication called Voquezna       For constipation continue prunes or add Miralax or Colace

## 2025-07-01 ENCOUNTER — HOSPITAL ENCOUNTER (OUTPATIENT)
Dept: NUCLEAR MEDICINE | Facility: HOSPITAL | Age: 25
Discharge: HOME | End: 2025-07-01
Admitting: NURSE PRACTITIONER
Payer: COMMERCIAL

## 2025-07-01 DIAGNOSIS — R11.2 NAUSEA AND VOMITING, UNSPECIFIED VOMITING TYPE: Chronic | ICD-10-CM

## 2025-07-01 PROCEDURE — 78264 GASTRIC EMPTYING IMG STUDY: CPT

## 2025-07-01 PROCEDURE — 34310000005 TECHNETIUM SULFUR COLLOID: Performed by: NURSE PRACTITIONER

## 2025-07-01 PROCEDURE — A9541 TC99M SULFUR COLLOID: HCPCS | Performed by: NURSE PRACTITIONER

## 2025-07-01 RX ADMIN — TECHNETIUM TC 99M SULFUR COLLOID 1 DOSE: KIT at 08:14

## 2025-07-16 ENCOUNTER — TELEPHONE (OUTPATIENT)
Dept: FAMILY MEDICINE CLINIC | Facility: CLINIC | Age: 25
End: 2025-07-16
Payer: COMMERCIAL

## 2025-07-16 DIAGNOSIS — K21.9 GASTROESOPHAGEAL REFLUX DISEASE, UNSPECIFIED WHETHER ESOPHAGITIS PRESENT: ICD-10-CM

## 2025-07-16 DIAGNOSIS — K29.60 OTHER GASTRITIS WITHOUT HEMORRHAGE, UNSPECIFIED CHRONICITY: ICD-10-CM

## 2025-07-16 RX ORDER — PANTOPRAZOLE SODIUM 40 MG/1
40 TABLET, DELAYED RELEASE ORAL 2 TIMES DAILY
Qty: 60 TABLET | Refills: 0 | Status: SHIPPED | OUTPATIENT
Start: 2025-07-16

## 2025-07-21 ENCOUNTER — PRIOR AUTHORIZATION (OUTPATIENT)
Dept: FAMILY MEDICINE CLINIC | Facility: CLINIC | Age: 25
End: 2025-07-21
Payer: COMMERCIAL

## 2025-07-22 DIAGNOSIS — R11.2 NAUSEA AND VOMITING, UNSPECIFIED VOMITING TYPE: Primary | ICD-10-CM

## 2025-07-30 ENCOUNTER — PATIENT MESSAGE (OUTPATIENT)
Dept: GASTROENTEROLOGY | Facility: CLINIC | Age: 25
End: 2025-07-30
Payer: COMMERCIAL

## 2025-07-30 RX ORDER — RABEPRAZOLE SODIUM 20 MG/1
20 TABLET, DELAYED RELEASE ORAL DAILY
Qty: 90 TABLET | Refills: 1 | Status: SHIPPED | OUTPATIENT
Start: 2025-07-30

## 2025-08-04 ENCOUNTER — HOSPITAL ENCOUNTER (OUTPATIENT)
Dept: ULTRASOUND IMAGING | Facility: HOSPITAL | Age: 25
Discharge: HOME | End: 2025-08-04
Admitting: NURSE PRACTITIONER
Payer: COMMERCIAL

## 2025-08-04 DIAGNOSIS — R11.2 NAUSEA AND VOMITING, UNSPECIFIED VOMITING TYPE: ICD-10-CM

## 2025-08-04 PROCEDURE — 76705 ECHO EXAM OF ABDOMEN: CPT

## 2025-08-06 ENCOUNTER — TELEPHONE (OUTPATIENT)
Dept: GASTROENTEROLOGY | Facility: CLINIC | Age: 25
End: 2025-08-06
Payer: COMMERCIAL

## 2025-08-07 RX ORDER — ESOMEPRAZOLE MAGNESIUM 40 MG/1
40 CAPSULE, DELAYED RELEASE ORAL
Qty: 30 CAPSULE | Refills: 5 | Status: SHIPPED | OUTPATIENT
Start: 2025-08-07

## 2025-08-12 DIAGNOSIS — R11.2 NAUSEA AND VOMITING, UNSPECIFIED VOMITING TYPE: ICD-10-CM

## 2025-08-12 RX ORDER — ONDANSETRON 4 MG/1
4 TABLET, ORALLY DISINTEGRATING ORAL EVERY 8 HOURS PRN
Qty: 30 TABLET | Refills: 0 | Status: SHIPPED | OUTPATIENT
Start: 2025-08-12

## 2025-08-14 ENCOUNTER — OFFICE VISIT (OUTPATIENT)
Dept: FAMILY MEDICINE CLINIC | Facility: CLINIC | Age: 25
End: 2025-08-14
Payer: COMMERCIAL

## 2025-08-14 VITALS
HEART RATE: 85 BPM | WEIGHT: 163 LBS | RESPIRATION RATE: 18 BRPM | HEIGHT: 69 IN | SYSTOLIC BLOOD PRESSURE: 112 MMHG | OXYGEN SATURATION: 99 % | BODY MASS INDEX: 24.14 KG/M2 | DIASTOLIC BLOOD PRESSURE: 72 MMHG | TEMPERATURE: 98.1 F

## 2025-08-14 DIAGNOSIS — K21.00 GASTROESOPHAGEAL REFLUX DISEASE WITH ESOPHAGITIS WITHOUT HEMORRHAGE: ICD-10-CM

## 2025-08-14 DIAGNOSIS — R26.9 GAIT ABNORMALITY: Primary | ICD-10-CM

## 2025-08-14 PROCEDURE — 99214 OFFICE O/P EST MOD 30 MIN: CPT | Performed by: FAMILY MEDICINE

## 2025-08-14 RX ORDER — SUCRALFATE 1 G/1
1 TABLET ORAL NIGHTLY
Qty: 30 TABLET | Refills: 1 | Status: SHIPPED | OUTPATIENT
Start: 2025-08-14

## 2025-08-14 RX ORDER — RABEPRAZOLE SODIUM 20 MG/1
20 TABLET, DELAYED RELEASE ORAL DAILY
Qty: 90 TABLET | Refills: 1 | Status: SHIPPED | OUTPATIENT
Start: 2025-08-14

## 2025-08-14 RX ORDER — PANTOPRAZOLE SODIUM 40 MG/1
40 TABLET, DELAYED RELEASE ORAL DAILY
COMMUNITY
End: 2025-08-14

## 2025-08-19 ENCOUNTER — PRIOR AUTHORIZATION (OUTPATIENT)
Dept: FAMILY MEDICINE CLINIC | Facility: CLINIC | Age: 25
End: 2025-08-19
Payer: COMMERCIAL

## 2025-08-20 ENCOUNTER — OFFICE VISIT (OUTPATIENT)
Dept: GASTROENTEROLOGY | Facility: CLINIC | Age: 25
End: 2025-08-20
Payer: COMMERCIAL

## 2025-08-20 ENCOUNTER — TELEPHONE (OUTPATIENT)
Age: 25
End: 2025-08-20

## 2025-08-20 VITALS
OXYGEN SATURATION: 97 % | BODY MASS INDEX: 24.11 KG/M2 | TEMPERATURE: 97.7 F | HEART RATE: 75 BPM | HEIGHT: 69 IN | SYSTOLIC BLOOD PRESSURE: 116 MMHG | DIASTOLIC BLOOD PRESSURE: 70 MMHG | WEIGHT: 162.8 LBS

## 2025-08-20 DIAGNOSIS — R11.2 NAUSEA AND VOMITING, UNSPECIFIED VOMITING TYPE: ICD-10-CM

## 2025-08-20 DIAGNOSIS — R10.13 EPIGASTRIC PAIN: Primary | ICD-10-CM

## 2025-08-20 DIAGNOSIS — K21.00 GASTROESOPHAGEAL REFLUX DISEASE WITH ESOPHAGITIS WITHOUT HEMORRHAGE: ICD-10-CM

## 2025-08-20 PROCEDURE — 99214 OFFICE O/P EST MOD 30 MIN: CPT | Performed by: NURSE PRACTITIONER

## 2025-08-20 RX ORDER — DEXLANSOPRAZOLE 60 MG/1
60 CAPSULE, DELAYED RELEASE ORAL DAILY
Qty: 30 CAPSULE | Refills: 5 | Status: SHIPPED | OUTPATIENT
Start: 2025-08-20

## 2025-08-22 ENCOUNTER — TELEPHONE (OUTPATIENT)
Dept: GASTROENTEROLOGY | Facility: CLINIC | Age: 25
End: 2025-08-22
Payer: COMMERCIAL

## 2025-08-28 ENCOUNTER — TELEPHONE (OUTPATIENT)
Dept: ORTHOPEDIC SURGERY | Facility: CLINIC | Age: 25
End: 2025-08-28
Payer: COMMERCIAL